# Patient Record
Sex: FEMALE | Race: WHITE | NOT HISPANIC OR LATINO | Employment: OTHER | URBAN - METROPOLITAN AREA
[De-identification: names, ages, dates, MRNs, and addresses within clinical notes are randomized per-mention and may not be internally consistent; named-entity substitution may affect disease eponyms.]

---

## 2017-01-03 ENCOUNTER — ANTI-COAG VISIT (OUTPATIENT)
Dept: CARDIOLOGY | Facility: CLINIC | Age: 82
End: 2017-01-03

## 2017-01-03 DIAGNOSIS — Z79.01 LONG TERM CURRENT USE OF ANTICOAGULANT THERAPY: ICD-10-CM

## 2017-01-03 LAB — INR PPP: 1.5

## 2017-01-03 RX ORDER — DONEPEZIL HYDROCHLORIDE 10 MG/1
TABLET, FILM COATED ORAL
Qty: 90 TABLET | Refills: 3 | Status: SHIPPED | OUTPATIENT
Start: 2017-01-03 | End: 2017-03-15 | Stop reason: SDUPTHER

## 2017-01-03 NOTE — PROGRESS NOTES
2.5mg thurs, fri, 5mg sat; missed sun, mon.  pt reports dental work on 1/10 three teeth to be  Pulled. [id number] score = 5, and recent cards note states in sinus. Will hold 2 days prior as INR already subtherapeutic. Pt to resume 1/10 at

## 2017-01-07 ENCOUNTER — NURSE TRIAGE (OUTPATIENT)
Dept: ADMINISTRATIVE | Facility: CLINIC | Age: 82
End: 2017-01-07

## 2017-01-07 NOTE — TELEPHONE ENCOUNTER
Reason for Disposition   [1] Other NON-URGENT information for PCP AND [2] does not require PCP response    Protocols used: ST PCP CALL - NO TRIAGE-A-    Appointment cancellation

## 2017-02-08 NOTE — PROGRESS NOTES
Referral authorization (Billing Code ), Tax ID [id number]; Processed with [employer] 2/8/17; Authorization code= [id number]

## 2017-03-07 ENCOUNTER — ANTI-COAG VISIT (OUTPATIENT)
Dept: CARDIOLOGY | Facility: CLINIC | Age: 82
End: 2017-03-07

## 2017-03-07 DIAGNOSIS — Z79.01 LONG TERM CURRENT USE OF ANTICOAGULANT THERAPY: ICD-10-CM

## 2017-03-07 LAB — INR PPP: 1.1

## 2017-03-14 ENCOUNTER — ANTI-COAG VISIT (OUTPATIENT)
Dept: CARDIOLOGY | Facility: CLINIC | Age: 82
End: 2017-03-14

## 2017-03-14 DIAGNOSIS — Z79.01 LONG TERM CURRENT USE OF ANTICOAGULANT THERAPY: ICD-10-CM

## 2017-03-14 LAB — INR PPP: 1.4

## 2017-03-15 ENCOUNTER — LAB VISIT (OUTPATIENT)
Dept: LAB | Facility: HOSPITAL | Age: 82
End: 2017-03-15
Attending: INTERNAL MEDICINE
Payer: MEDICARE

## 2017-03-15 ENCOUNTER — OFFICE VISIT (OUTPATIENT)
Dept: INTERNAL MEDICINE | Facility: CLINIC | Age: 82
End: 2017-03-15
Payer: MEDICARE

## 2017-03-15 VITALS
BODY MASS INDEX: 32.54 KG/M2 | HEIGHT: 62 IN | WEIGHT: 176.81 LBS | DIASTOLIC BLOOD PRESSURE: 70 MMHG | SYSTOLIC BLOOD PRESSURE: 130 MMHG | OXYGEN SATURATION: 94 % | HEART RATE: 46 BPM

## 2017-03-15 DIAGNOSIS — N18.30 TYPE 2 DIABETES MELLITUS WITH STAGE 3 CHRONIC KIDNEY DISEASE, WITHOUT LONG-TERM CURRENT USE OF INSULIN: ICD-10-CM

## 2017-03-15 DIAGNOSIS — N18.30 CKD (CHRONIC KIDNEY DISEASE) STAGE 3, GFR 30-59 ML/MIN: ICD-10-CM

## 2017-03-15 DIAGNOSIS — F03.91 DEMENTIA WITH BEHAVIORAL DISTURBANCE, UNSPECIFIED DEMENTIA TYPE: Primary | ICD-10-CM

## 2017-03-15 DIAGNOSIS — E11.22 TYPE 2 DIABETES MELLITUS WITH STAGE 3 CHRONIC KIDNEY DISEASE, WITHOUT LONG-TERM CURRENT USE OF INSULIN: ICD-10-CM

## 2017-03-15 DIAGNOSIS — I10 ESSENTIAL HYPERTENSION: ICD-10-CM

## 2017-03-15 DIAGNOSIS — J30.9 CHRONIC ALLERGIC RHINITIS: ICD-10-CM

## 2017-03-15 PROBLEM — E11.9 TYPE 2 DIABETES MELLITUS, WITHOUT LONG-TERM CURRENT USE OF INSULIN: Status: ACTIVE | Noted: 2017-03-15

## 2017-03-15 LAB
ALBUMIN SERPL BCP-MCNC: 3.3 G/DL
ALP SERPL-CCNC: 59 U/L
ALT SERPL W/O P-5'-P-CCNC: 11 U/L
ANION GAP SERPL CALC-SCNC: 8 MMOL/L
AST SERPL-CCNC: 10 U/L
BASOPHILS # BLD AUTO: 0.05 K/UL
BASOPHILS NFR BLD: 0.7 %
BILIRUB SERPL-MCNC: 0.6 MG/DL
BUN SERPL-MCNC: 17 MG/DL
CALCIUM SERPL-MCNC: 10 MG/DL
CHLORIDE SERPL-SCNC: 100 MMOL/L
CO2 SERPL-SCNC: 29 MMOL/L
CREAT SERPL-MCNC: 1.2 MG/DL
DIFFERENTIAL METHOD: NORMAL
EOSINOPHIL # BLD AUTO: 0.2 K/UL
EOSINOPHIL NFR BLD: 2.4 %
ERYTHROCYTE [DISTWIDTH] IN BLOOD BY AUTOMATED COUNT: 12.7 %
EST. GFR  (AFRICAN AMERICAN): 46.6 ML/MIN/1.73 M^2
EST. GFR  (NON AFRICAN AMERICAN): 40.4 ML/MIN/1.73 M^2
GLUCOSE SERPL-MCNC: 193 MG/DL
HCT VFR BLD AUTO: 46.4 %
HGB BLD-MCNC: 15.6 G/DL
LYMPHOCYTES # BLD AUTO: 1.4 K/UL
LYMPHOCYTES NFR BLD: 19.6 %
MCH RBC QN AUTO: 28.9 PG
MCHC RBC AUTO-ENTMCNC: 33.6 %
MCV RBC AUTO: 86 FL
MONOCYTES # BLD AUTO: 0.6 K/UL
MONOCYTES NFR BLD: 7.6 %
NEUTROPHILS # BLD AUTO: 5 K/UL
NEUTROPHILS NFR BLD: 69.3 %
PLATELET # BLD AUTO: 186 K/UL
PMV BLD AUTO: 9.4 FL
POTASSIUM SERPL-SCNC: 4.4 MMOL/L
PROT SERPL-MCNC: 6.7 G/DL
RBC # BLD AUTO: 5.4 M/UL
SODIUM SERPL-SCNC: 137 MMOL/L
WBC # BLD AUTO: 7.23 K/UL

## 2017-03-15 PROCEDURE — 1157F ADVNC CARE PLAN IN RCRD: CPT | Mod: S$GLB,,, | Performed by: INTERNAL MEDICINE

## 2017-03-15 PROCEDURE — 99999 PR PBB SHADOW E&M-EST. PATIENT-LVL III: CPT | Mod: PBBFAC,,, | Performed by: INTERNAL MEDICINE

## 2017-03-15 PROCEDURE — 1126F AMNT PAIN NOTED NONE PRSNT: CPT | Mod: S$GLB,,, | Performed by: INTERNAL MEDICINE

## 2017-03-15 PROCEDURE — 80053 COMPREHEN METABOLIC PANEL: CPT

## 2017-03-15 PROCEDURE — 99215 OFFICE O/P EST HI 40 MIN: CPT | Mod: S$GLB,,, | Performed by: INTERNAL MEDICINE

## 2017-03-15 PROCEDURE — 1159F MED LIST DOCD IN RCRD: CPT | Mod: S$GLB,,, | Performed by: INTERNAL MEDICINE

## 2017-03-15 PROCEDURE — 1160F RVW MEDS BY RX/DR IN RCRD: CPT | Mod: S$GLB,,, | Performed by: INTERNAL MEDICINE

## 2017-03-15 PROCEDURE — 83036 HEMOGLOBIN GLYCOSYLATED A1C: CPT

## 2017-03-15 PROCEDURE — 36415 COLL VENOUS BLD VENIPUNCTURE: CPT

## 2017-03-15 PROCEDURE — 85025 COMPLETE CBC W/AUTO DIFF WBC: CPT

## 2017-03-15 PROCEDURE — 99499 UNLISTED E&M SERVICE: CPT | Mod: S$GLB,,, | Performed by: INTERNAL MEDICINE

## 2017-03-15 RX ORDER — DONEPEZIL HYDROCHLORIDE 10 MG/1
10 TABLET, FILM COATED ORAL EVERY MORNING
Qty: 90 TABLET | Refills: 3 | Status: SHIPPED | OUTPATIENT
Start: 2017-03-15 | End: 2017-10-31 | Stop reason: SDUPTHER

## 2017-03-15 RX ORDER — NAPROXEN SODIUM 220 MG/1
81 TABLET, FILM COATED ORAL EVERY MORNING
Start: 2017-03-15 | End: 2017-05-03

## 2017-03-15 RX ORDER — FLUTICASONE PROPIONATE 50 MCG
2 SPRAY, SUSPENSION (ML) NASAL DAILY
Qty: 16 G | Refills: 12 | Status: SHIPPED | OUTPATIENT
Start: 2017-03-15 | End: 2017-04-14

## 2017-03-15 RX ORDER — AMLODIPINE BESYLATE 5 MG/1
5 TABLET ORAL EVERY MORNING
Qty: 90 TABLET | Refills: 3 | Status: SHIPPED | OUTPATIENT
Start: 2017-03-15 | End: 2017-10-31 | Stop reason: SDUPTHER

## 2017-03-15 RX ORDER — LISINOPRIL 10 MG/1
10 TABLET ORAL EVERY MORNING
Qty: 90 TABLET | Refills: 3 | Status: SHIPPED | OUTPATIENT
Start: 2017-03-15 | End: 2017-10-31 | Stop reason: SDUPTHER

## 2017-03-15 NOTE — MR AVS SNAPSHOT
[facility]  [address]  [facility] [address]  Phone: [phone]  Fax: [phone]                  Ifeoma Badillo   3/15/2017 1:00 PM   Office Visit    Description:  Female : 12/15/1928   Provider:  Perla Pina MD   Department:  [facility]           Reason for Visit     Dizziness     Dementia           Diagnoses this Visit        Comments    Dementia with behavioral disturbance, unspecified dementia type    -  Primary     Essential hypertension         Chronic allergic rhinitis         CKD (chronic kidney disease) stage 3, GFR 30-59 ml/min         Type 2 diabetes mellitus with stage 3 chronic kidney disease, without long-term current use of insulin                To Do List           Goals (5 Years of Data)     None      Follow-Up and Disposition     Return in about 6 months (around 9/15/2017).       These Medications        Disp Refills Start End    amlodipine (NORVASC) 5 MG tablet 90 tablet 3 3/15/2017 3/15/2018    Take 1 tablet (5 mg total) by mouth every morning. BP control - Oral    Pharmacy: [facility] - [address] Ph #: [phone]       lisinopril 10 MG tablet 90 tablet 3 3/15/2017     Take 1 tablet (10 mg total) by mouth every morning. - Oral    Pharmacy: [facility] - [address] Ph #: [phone]       donepezil (ARICEPT) 10 MG tablet 90 tablet 3 3/15/2017     Take 1 tablet (10 mg total) by mouth every morning. - Oral    Pharmacy: [facility] - [address] Ph #: [phone]       aspirin 81 MG Chew   3/15/2017     Take 1 tablet (81 mg total) by mouth every morning. - Oral    Pharmacy: [facility] - [address] Ph #: [phone]       fluticasone (FLONASE) 50 mcg/actuation nasal spray 16 g 12 3/15/2017 2017    2 sprays by Each Nare route once daily. - Each  [facility]    Pharmacy: [facility] [address] [facility] Ph #: [phone]         [facility]     [facility] On [facility] Nurse Care Line -  Assistance  Registered nurses in the [facility] provide clinical advisement, health education, appointment booking, and other advisory services.  Call for this free service at [phone].             Medications           Message regarding Medications     Verify the changes and/or additions to your medication regime listed below are the same as discussed with your clinician today.  If any of these changes or additions are incorrect, please notify your healthcare provider.        START taking these NEW medications        Refills    aspirin 81 MG Chew     Sig: Take 1 tablet (81 mg total) by mouth every morning.    Class: No Print    Route: Oral    fluticasone (FLONASE) 50 mcg/actuation nasal spray 12    Si sprays by Each Nare route once daily.    Class: Normal    Route: Each Nare      CHANGE how you are taking these medications     Start Taking Instead of    amlodipine (NORVASC) 5 MG tablet amlodipine (NORVASC) 5 MG tablet    Dosage:  Take 1 tablet (5 mg total) by mouth every morning. BP control Dosage:  Take 1 tablet (5 mg total) by mouth once daily. BP control    Reason for Change:  Reorder     lisinopril 10 MG tablet lisinopril 10 MG tablet    Dosage:  Take 1 tablet (10 mg total) by mouth every morning. Dosage:  TAKE 1 TABLET (10 MG TOTAL) BY MOUTH 2 (TWO) TIMES DAILY.    Reason for Change:  Reorder     donepezil (ARICEPT) 10 MG tablet donepezil (ARICEPT) 10 MG tablet    Dosage:  Take 1 tablet (10 mg total) by mouth every morning. Dosage:  TAKE 1 TABLET EVERY EVENING    Reason for Change:  Reorder       STOP taking these medications     aspirin 81 mg Tab Take by mouth. 1 Tablet Oral Every day    trospium (SANCTURA) 20 mg Tab tablet Take 1 tablet (20 mg total) by mouth 2 (two) times daily. To reduce frequent urination.     "warfarin (COUMADIN) 2.5 MG tablet Take 1 to 2 pills by mouth daily as directed per the [facility]           Verify that the below list of medications is an accurate representation of the medications you are currently taking.  If none reported, the list may be blank. If incorrect, please contact your healthcare provider. Carry this list with you in case of emergency.           Current Medications     amlodipine (NORVASC) 5 MG tablet Take 1 tablet (5 mg total) by mouth every morning. BP control    cholecalciferol, vitamin D3, (VITAMIN D3) 2,000 unit Cap Take 1 capsule by mouth once daily.    donepezil (ARICEPT) 10 MG tablet Take 1 tablet (10 mg total) by mouth every morning.    lisinopril 10 MG tablet Take 1 tablet (10 mg total) by mouth every morning.    meclizine (ANTIVERT) 12.5 mg tablet Take 12.5 mg by mouth 2 (two) times daily as needed.    metoprolol succinate (TOPROL-XL) 200 MG 24 hr tablet Take 1 tablet (200 mg total) by mouth once daily.    tramadol (ULTRAM) 50 mg tablet     aspirin 81 MG Chew Take 1 tablet (81 mg total) by mouth every morning.    fluticasone (FLONASE) 50 mcg/actuation nasal spray 2 sprays by Each Nare route once daily.           Clinical Reference Information           Your Vitals Were     BP Pulse Height Weight SpO2 BMI    130/70 46 5' 2" (1.575 m) 80.2 kg (176 lb 12.9 oz) 94% 32.34 kg/m2      Blood Pressure          Most Recent Value    BP  130/70      Allergies as of 3/15/2017     No Known Allergies      Immunizations Administered on Date of Encounter - 3/15/2017     None      Orders Placed During Today's Visit     Future Labs/Procedures Expected by Expires    CBC auto differential  3/15/2017 3/15/2018    Comprehensive metabolic panel  3/15/2017 5/14/2018    Hemoglobin A1c  3/15/2017 5/14/2018      [facility] Sign-Up     Activating your [facility] account is as easy as 1-2-3!     1) Visit [email or web address], select Sign Up Now, enter this activation code and your date of birth, then select " Next.  [email or web address]  Expires: 4/29/2017  2:12 PM      2) Create a username and password to use when you visit [facility] in the future and select a security question in case you lose your password and select Next.    3) Enter your e-mail address and click Sign Up!    Additional Information  If you have questions, please e-mail [email or web address] or call [phone] to talk to our [facility] staff. Remember, [facility] is NOT to be used for urgent needs. For medical emergencies, dial 911.         Instructions      Using Blood Thinners (Anticoagulants)  Blood thinners or anticoagulants are medicines that help prevent blood clots from forming. They include warfarin, heparin, dabigatran, rivaroxaban, apixaban,and edoxaban. Your health care provider will help you decide which medicine is best for you.    Taking an anticoagulant safely  When you are taking a blood thinner, you will need to take certain steps to stay safe. Too much blood thinner puts you at risk for bleeding. Too little puts you at risk for stroke. Follow these guidelines. Also follow any others that your health care provider gives you.  · You may be told you need regular lab testing while taking these medicines. Warfarin requires routine testing to blood-thinning level testing while the other medicines do not.  · Tell your doctor about all medicines you take. This includes over the counter medicines, supplements, or herbal remedies. Do not take any medicines (including ones you buy over the counter) that your doctor doesnt know about. Some medicines can interact with blood thinners and cause serious problems.  · Tell any health care provider that you see for care (such as doctors, dentists, chiropractors, home health nurses) that you take a blood thinner.  · Carry a medical ID card or wear a medical-alert bracelet that says you take an anticoagulant.  · Before taking aspirin, check with your doctor. Aspirin can significantly increase your risk of  bleeding.  · This medicine makes bleeding harder to stop. To protect yourself:  ¨ Avoid activities that may cause injury. If you fall or are injured, contact your health care provider right away. Blood thinners prevent clotting, so you could be bleeding inside without realizing it.  ¨ Use a soft-bristle toothbrush and waxed dental floss. Shave with an electric razor rather than a blade.  ¨ Dont go barefoot. Dont trim corns or calluses yourself.  Warfarin: Other important information  Several precautions are especially important when you are taking warfarin. Always keep these points in mind:  · Be sure to follow your health care provider's instructions for taking warfarin.  · Take this medicine at the same time each day. Take it with a full glass of water, with or without food. If you miss a dose, contact your doctor to find out how much to take. Avoid takinga double dose.  · Warfarin is an effective drug, but it can be dangerous if not taken properly. It makes your blood less likely to form clots. If you take too much, it can cause serious internal or external bleeding.  · You will need to have regular monitoring while you are taking warfarin. This includes blood tests to check your international normalized ratio (INR) and prothrombin time (PT). These tests show how quickly your blood clots. You will also have a complete blood count (CBC) periodically. This looks at your blood and platelet levels. Both of these need to be followed while you're on warfarin. Talk with your health care provider about whether you need to visit the clinic every week, or if services are available for monitoring in your home.  · Certain medicines can affect your INR and PT levels. Tell your health care provider if there are any changes in your medicines. This includes any over-the-counter medicines, supplements like vitamin K, or herbal remedies.  · Your diet can also affect your INR and PT levels. Because of this, it's important to eat  a consistent diet. It is especially important to eat a consistent amount of foods that are high in vitamin K. Be sure to talk with your health care provider before making any big changes in your diet.  · Remember that warfarin increases your risk of bleeding. Be careful not to injure yourself. If you have a significant injury, contact your health care provider right away. It's important to alert your doctor if you've fallen or hurt yourself, even if you don't break your skin. You could be bleeding inside your body without realizing it.     Warfarin: Watch your INR/PT blood levels  Two tests are used to find out how your blood is clotting. One is protime (PT), the other is the international normalized ratio (INR).  · Go for your blood tests (INR/PT) as often as directed. Your diet and the other medicines you take can affect your INR/PT levels.  · Your INR was between ___ and ___.  · Ask your doctor what your goal INR is. My goal INR is between ___ and ___.  · My next INR/PT blood draw is due on _____________ (date) at ___________ (time) by ___________ (name of doctor or clinic).  · The name of the doctor who is monitoring my anticoagulation therapy is _____________________ and the phone number is _________________.  · Follow up with your doctor or as advised by his or her staff. It usually takes a few hours for your doctor to get the results of your clotting tests. Call to get your lab results to find out if your doctor needs to make further changes to your warfarin dose.  · If your blood is drawn for these tests at a location other than your doctor's office, tell your doctor as soon as you get your lab results.   Warfarin: Watch what you eat  Vitamin K helps your blood clot. So you have to watch how much you eat of foods that contain vitamin K. These foods can affect the way warfarin works. They do not affect the other non-warfarin blood thinners.Here are some specific tips:  · Try to keep your diet about the same  each day. If you change your diet for any reason, such as for illness or to lose weight, be sure to tell your doctor.  · Each day, eat the same amount of foods that are high in vitamin K. These include asparagus, avocado, broccoli, cabbage, kale, spinach, and some other leafy green vegetables. Oils, such as soybean, canola, and olive oils, are also high in vitamin K.  · Limit fats to 2 to 4 tablespoons a day.  · Ask your health care provider if you should avoid alcohol while you are taking a blood thinner.  · Avoid teas that contain sweet clover, sweet kam, or tonka beans. These can affect how your medicine works.  · Talk with your health care provider and pharmacist about specific foods or special diets that can affect anticoagulant levels. These include grapefruit juice, cranberries and cranberry juice, fish oil supplements, garlic, osmin, licorice, turmeric, and herbal teas and supplements.  Talk with your health care provider if you have concerns about these or other food products and their effects on warfarin.     When to call your doctor if youre taking an anticoagulant  Call your doctor right away if you have any of these:  · Bleeding that doesnt stop in 10 minutes  · A heavier-than-normal menstrual period or bleeding between periods  · Coughing or throwing up blood  · Bloody diarrhea or bleeding hemorrhoids   · Dark-colored urine or black stools  · Red or black-and-blue marks on the skin that get larger  · Dizziness or fatigue  · Chest pain or trouble breathing  Allergic reactions:  · Rash  · Itching  · Swelling  · Trouble swallowing or breathing   Medical conditions and anticoagulants  Before starting a blood thinner, be sure your doctor knows if you have any of these conditions:  · Stomach ulcer now or in the past  · Vomited blood or had bloody stools (black or red color)  · Aneurysm, pericarditis, or pericardial effusion  · Blood disorder  · Recent surgery, stroke, mini-stroke, or spinal  puncture  · Kidney or liver disease, uncontrolled high blood pressure, diabetes, vasculitis, heart failure, lupus, or other collagen-vascular disease, or high cholesterol  · Pregnancy or breastfeeding  · Younger than 18 years old  · Recent or planned dental procedure  Drug interactions and anticoagulants  Many medicines interfere with the effect of blood thinners. Before starting these medicines, be sure your doctor knows about any prescription, over-the-counter, or herbal supplements you take. In particular, tell your provider about:  · Antibiotics  · Heart medicines  · Cimetidine  · Aspirin or other anti-inflammatory drugs such as ibuprofen, naproxen, ketoprofen, or other arthritis medicines  · Drugs for depression, cancer HIV (protease inhibitors), diabetes, seizures, gout, high cholesterol, or thyroid replacement  · Vitamins containing vitamin K or herbal products such as ginkgo, Co-Q10, garlic, or Valley's wort     Note: This information topic may not include all directions, precautions, medical conditions, drug/food interactions, and warnings for these medicines. Check with your doctor, nurse, or pharmacist for any questions you have.    Date Last Reviewed: 6/8/2015 © 2000-2016 [employer]. [address] [place] [address]. All rights reserved. This information is not intended as a substitute for professional medical care. Always follow your healthcare professional's instructions.             Language Assistance Services     ATTENTION: Language assistance services are available, free of charge. Please call [phone].      ATENCIÓN: Si habla español, tiene a easton disposición servicios gratuitos de asistencia lingüística. Llame al [phone].     [facility] Ý: N?u b?n nói Ti?ng Vi?t, có các d?ch v? h? tr? ngôn ng? mi?n phí dành cho b?n. G?i s? [phone].         [facility] complies with applicable Federal civil rights laws and does not discriminate on the basis of  race, color, national origin, age, disability, or sex.

## 2017-03-15 NOTE — PATIENT INSTRUCTIONS
Using Blood Thinners (Anticoagulants)  Blood thinners or anticoagulants are medicines that help prevent blood clots from forming. They include warfarin, heparin, dabigatran, rivaroxaban, apixaban,and edoxaban. Your health care provider will help you decide which medicine is best for you.    Taking an anticoagulant safely  When you are taking a blood thinner, you will need to take certain steps to stay safe. Too much blood thinner puts you at risk for bleeding. Too little puts you at risk for stroke. Follow these guidelines. Also follow any others that your health care provider gives you.  · You may be told you need regular lab testing while taking these medicines. Warfarin requires routine testing to blood-thinning level testing while the other medicines do not.  · Tell your doctor about all medicines you take. This includes over the counter medicines, supplements, or herbal remedies. Do not take any medicines (including ones you buy over the counter) that your doctor doesnt know about. Some medicines can interact with blood thinners and cause serious problems.  · Tell any health care provider that you see for care (such as doctors, dentists, chiropractors, home health nurses) that you take a blood thinner.  · Carry a medical ID card or wear a medical-alert bracelet that says you take an anticoagulant.  · Before taking aspirin, check with your doctor. Aspirin can significantly increase your risk of bleeding.  · This medicine makes bleeding harder to stop. To protect yourself:  ¨ Avoid activities that may cause injury. If you fall or are injured, contact your health care provider right away. Blood thinners prevent clotting, so you could be bleeding inside without realizing it.  ¨ Use a soft-bristle toothbrush and waxed dental floss. Shave with an electric razor rather than a blade.  ¨ Dont go barefoot. Dont trim corns or calluses yourself.  Warfarin: Other important information  Several precautions are  especially important when you are taking warfarin. Always keep these points in mind:  · Be sure to follow your health care provider's instructions for taking warfarin.  · Take this medicine at the same time each day. Take it with a full glass of water, with or without food. If you miss a dose, contact your doctor to find out how much to take. Avoid takinga double dose.  · Warfarin is an effective drug, but it can be dangerous if not taken properly. It makes your blood less likely to form clots. If you take too much, it can cause serious internal or external bleeding.  · You will need to have regular monitoring while you are taking warfarin. This includes blood tests to check your international normalized ratio (INR) and prothrombin time (PT). These tests show how quickly your blood clots. You will also have a complete blood count (CBC) periodically. This looks at your blood and platelet levels. Both of these need to be followed while you're on warfarin. Talk with your health care provider about whether you need to visit the clinic every week, or if services are available for monitoring in your home.  · Certain medicines can affect your INR and PT levels. Tell your health care provider if there are any changes in your medicines. This includes any over-the-counter medicines, supplements like vitamin K, or herbal remedies.  · Your diet can also affect your INR and PT levels. Because of this, it's important to eat a consistent diet. It is especially important to eat a consistent amount of foods that are high in vitamin K. Be sure to talk with your health care provider before making any big changes in your diet.  · Remember that warfarin increases your risk of bleeding. Be careful not to injure yourself. If you have a significant injury, contact your health care provider right away. It's important to alert your doctor if you've fallen or hurt yourself, even if you don't break your skin. You could be bleeding inside your  body without realizing it.     Warfarin: Watch your INR/PT blood levels  Two tests are used to find out how your blood is clotting. One is protime (PT), the other is the international normalized ratio (INR).  · Go for your blood tests (INR/PT) as often as directed. Your diet and the other medicines you take can affect your INR/PT levels.  · Your INR was between ___ and ___.  · Ask your doctor what your goal INR is. My goal INR is between ___ and ___.  · My next INR/PT blood draw is due on _____________ (date) at ___________ (time) by ___________ (name of doctor or clinic).  · The name of the doctor who is monitoring my anticoagulation therapy is _____________________ and the phone number is _________________.  · Follow up with your doctor or as advised by his or her staff. It usually takes a few hours for your doctor to get the results of your clotting tests. Call to get your lab results to find out if your doctor needs to make further changes to your warfarin dose.  · If your blood is drawn for these tests at a location other than your doctor's office, tell your doctor as soon as you get your lab results.   Warfarin: Watch what you eat  Vitamin K helps your blood clot. So you have to watch how much you eat of foods that contain vitamin K. These foods can affect the way warfarin works. They do not affect the other non-warfarin blood thinners.Here are some specific tips:  · Try to keep your diet about the same each day. If you change your diet for any reason, such as for illness or to lose weight, be sure to tell your doctor.  · Each day, eat the same amount of foods that are high in vitamin K. These include asparagus, avocado, broccoli, cabbage, kale, spinach, and some other leafy green vegetables. Oils, such as soybean, canola, and olive oils, are also high in vitamin K.  · Limit fats to 2 to 4 tablespoons a day.  · Ask your health care provider if you should avoid alcohol while you are taking a blood  thinner.  · Avoid teas that contain sweet clover, sweet kam, or tonka beans. These can affect how your medicine works.  · Talk with your health care provider and pharmacist about specific foods or special diets that can affect anticoagulant levels. These include grapefruit juice, cranberries and cranberry juice, fish oil supplements, garlic, osmin, licorice, turmeric, and herbal teas and supplements.  Talk with your health care provider if you have concerns about these or other food products and their effects on warfarin.     When to call your doctor if youre taking an anticoagulant  Call your doctor right away if you have any of these:  · Bleeding that doesnt stop in 10 minutes  · A heavier-than-normal menstrual period or bleeding between periods  · Coughing or throwing up blood  · Bloody diarrhea or bleeding hemorrhoids   · Dark-colored urine or black stools  · Red or black-and-blue marks on the skin that get larger  · Dizziness or fatigue  · Chest pain or trouble breathing  Allergic reactions:  · Rash  · Itching  · Swelling  · Trouble swallowing or breathing   Medical conditions and anticoagulants  Before starting a blood thinner, be sure your doctor knows if you have any of these conditions:  · Stomach ulcer now or in the past  · Vomited blood or had bloody stools (black or red color)  · Aneurysm, pericarditis, or pericardial effusion  · Blood disorder  · Recent surgery, stroke, mini-stroke, or spinal puncture  · Kidney or liver disease, uncontrolled high blood pressure, diabetes, vasculitis, heart failure, lupus, or other collagen-vascular disease, or high cholesterol  · Pregnancy or breastfeeding  · Younger than 18 years old  · Recent or planned dental procedure  Drug interactions and anticoagulants  Many medicines interfere with the effect of blood thinners. Before starting these medicines, be sure your doctor knows about any prescription, over-the-counter, or herbal supplements you take. In  particular, tell your provider about:  · Antibiotics  · Heart medicines  · Cimetidine  · Aspirin or other anti-inflammatory drugs such as ibuprofen, naproxen, ketoprofen, or other arthritis medicines  · Drugs for depression, cancer HIV (protease inhibitors), diabetes, seizures, gout, high cholesterol, or thyroid replacement  · Vitamins containing vitamin K or herbal products such as ginkgo, Co-Q10, garlic, or Gisel's wort     Note: This information topic may not include all directions, precautions, medical conditions, drug/food interactions, and warnings for these medicines. Check with your doctor, nurse, or pharmacist for any questions you have.    Date Last Reviewed: 6/8/2015  © 9389-9012 [employer]. [address] [place] [address], [place] [address]. All rights reserved. This information is not intended as a substitute for professional medical care. Always follow your healthcare professional's instructions.

## 2017-03-16 LAB
ESTIMATED AVG GLUCOSE: 163 MG/DL
HBA1C MFR BLD HPLC: 7.3 %

## 2017-03-16 NOTE — PROGRESS NOTES
Subjective:       Patient ID: Ifeoma Badillo is a 88 y.o. female.    Chief Complaint: Dizziness and Dementia   her daughter Bernarda who lives in [facility] and works full time comes to the office today with her   and with her son Al who is her primary caregiver.    The patient has PAF on coumadin, symptomatic atrial ectopy, diastolic dysfunction, HTN, DM2, HLD, NPH s/p  shunt, moderate Alzheimer's dementia, and vertigo.    Al is showing signs of significant caregiver burnout.   Al does not have anyone to to take his place.  He does not have any time for himself.  He does leave his mother and she is safe unattended for short periods of time such as while he goes to the grocery store or the hardware store.    Al's main concern is that she is fighting him daily, not wanting to take her medication and will not take her medications without significant fussing.  It is wearing him down.    She is particularly afraid of Coumadin.  This started a few months ago when she had to have her bottom teeth pulled because of a gum infection.  She absolutely refused to take the Coumadin for over a month.  She has been back taking Coumadin pretty much daily since March 1.  Al  is in charge of checking her pro time at home.  It most recently was 1.4.  This gives him significant distress.  Her goal INR is 2-3.    The patient and Al both want to minimize medications.  The patient says she can't stand taking medications.  She no longer takes lovastatin because she is so poorly ambulatory it would be impossible to assess muscle toxicity.  Initially when she started taking Sanctura 20 mg twice a day seemed to help reduce her urinary frequency, but it is no longer working.  This medication will be discontinued.  HPI  Review of Systems   Constitutional: Negative for activity change, appetite change, chills, fatigue, fever and unexpected weight change.   HENT: Negative for hearing loss.    Eyes: Negative for visual disturbance.    Respiratory: Negative for cough, chest tightness, shortness of breath and wheezing.    Cardiovascular: Negative for chest pain, palpitations and leg swelling.   Gastrointestinal: Negative for abdominal pain, constipation, nausea and vomiting.   Genitourinary: Negative for dysuria, frequency and urgency.   Musculoskeletal: Negative for arthralgias, back pain, gait problem, joint swelling and myalgias.   Skin: Negative for rash.   Neurological: Negative for light-headedness and headaches.   Psychiatric/Behavioral: Negative for dysphoric mood and sleep disturbance. The patient is not nervous/anxious.        Objective:      Physical Exam   Constitutional: She is oriented to person, place, and time. She appears well-developed and well-nourished. No distress.   HENT:   Head: Atraumatic.   Eyes: Conjunctivae are normal. No scleral icterus.   Neck: Neck supple.   Cardiovascular: Normal rate.    Rhythm appears to be atrial fib   Pulmonary/Chest: Effort normal and breath sounds normal. No respiratory distress. She has no wheezes. She has no rales. She exhibits no tenderness.   Abdominal: Soft. There is no tenderness.   Musculoskeletal: She exhibits no edema.   Lymphadenopathy:     She has no cervical adenopathy.   Neurological: She is alert and oriented to person, place, and time.   Skin: Skin is warm and dry.   Psychiatric: She has a normal mood and affect. Her behavior is normal.   Nursing note and vitals reviewed.      Assessment:       1. Dementia with behavioral disturbance, unspecified dementia type    2. Essential hypertension    3. Chronic allergic rhinitis    4. CKD (chronic kidney disease) stage 3, GFR 30-59 ml/min    5. Type 2 diabetes mellitus with stage 3 chronic kidney disease, without long-term current use of insulin        Plan:   Ifeoma was seen today for dizziness and dementia.    Diagnoses and all orders for this visit:    Dementia with behavioral disturbance, unspecified dementia type, slowly  progressive.  Diminished ambulatory ability.  Uses a walker.  Requires assistance with activities of daily living.  Her caregiver is finding it taxing to fight with her over taking Coumadin every day and monitoring her INR.  Multiple issues discussed.  This visit took over 1 hour.  I strongly recommend that Al try to develop a routine perhaps taking his mother to a [facility] either in [place] or in [place].  Informed about a new Alzheimer's support group available for caregivers at [facility].      I think there comes a time when the risk of a medication versus the benefits need to be seriously reevaluated.  At this time, it is my opinion that she should discontinue Coumadin.  Her two children Bernarda and Al are not sure if they want their mother to stop taking Coumadin on my recommendation or if they would rather meet with Dr. Polanco.  I will send this note to Dr. Polanco.    Essential hypertension  -     amlodipine (NORVASC) 5 MG tablet; Take 1 tablet (5 mg total) by mouth every morning. BP control    Chronic allergic rhinitis    CKD (chronic kidney disease) stage 3, GFR 30-59 ml/min  -     Comprehensive metabolic panel; Future  -     CBC auto differential; Future    Type 2 diabetes mellitus with stage 3 chronic kidney disease, without long-term current use of insulin  -     Hemoglobin A1c; Future    Other orders.  I simplify her medications as much as possible.  -     lisinopril 10 MG tablet; Take 1 tablet (10 mg total) by mouth every morning.  -     donepezil (ARICEPT) 10 MG tablet; Take 1 tablet (10 mg total) by mouth every morning.  -     aspirin 81 MG Chew; Take 1 tablet (81 mg total) by mouth every morning.  -     fluticasone (FLONASE) 50 mcg/actuation nasal spray; 2 sprays by Each Nare route once daily.  Medication List with Changes/Refills   New Medications    ASPIRIN 81 MG CHEW    Take 1 tablet (81 mg total) by mouth every morning.    FLUTICASONE  (FLONASE) 50 MCG/ACTUATION NASAL SPRAY    2 sprays by Each Nare route once daily.   Current Medications    CHOLECALCIFEROL, VITAMIN D3, (VITAMIN D3) 2,000 UNIT CAP    Take 1 capsule by mouth once daily.    MECLIZINE (ANTIVERT) 12.5 MG TABLET    Take 12.5 mg by mouth 2 (two) times daily as needed.    METOPROLOL SUCCINATE (TOPROL-XL) 200 MG 24 HR TABLET    Take 1 tablet (200 mg total) by mouth once daily.    TRAMADOL (ULTRAM) 50 MG TABLET       Changed and/or Refilled Medications    Modified Medication Previous Medication    AMLODIPINE (NORVASC) 5 MG TABLET amlodipine (NORVASC) 5 MG tablet       Take 1 tablet (5 mg total) by mouth every morning. BP control    Take 1 tablet (5 mg total) by mouth once daily. BP control    DONEPEZIL (ARICEPT) 10 MG TABLET donepezil (ARICEPT) 10 MG tablet       Take 1 tablet (10 mg total) by mouth every morning.    TAKE 1 TABLET EVERY EVENING    LISINOPRIL 10 MG TABLET lisinopril 10 MG tablet       Take 1 tablet (10 mg total) by mouth every morning.    TAKE 1 TABLET (10 MG TOTAL) BY MOUTH 2 (TWO) TIMES DAILY.   Discontinued Medications    ASPIRIN 81 MG TAB    Take by mouth. 1 Tablet Oral Every day    TROSPIUM (SANCTURA) 20 MG TAB TABLET    Take 1 tablet (20 mg total) by mouth 2 (two) times daily. To reduce frequent urination.    WARFARIN (COUMADIN) 2.5 MG TABLET    Take 1 to 2 pills by mouth daily as directed per the [facility]

## 2017-03-29 RX ORDER — TRAMADOL HYDROCHLORIDE 50 MG/1
TABLET ORAL
Qty: 270 TABLET | Refills: 1 | Status: SHIPPED | OUTPATIENT
Start: 2017-03-29 | End: 2017-04-06 | Stop reason: SDUPTHER

## 2017-03-30 NOTE — TELEPHONE ENCOUNTER
I printed a prescription for a tramadol refill.  I'm not sure where the patient has been receiving this medication.  It was set up to go to [facility].  I'm not sure if [facility] delivers this. Generally, the [facility] pharmacy is the most economical for the patient.

## 2017-04-04 ENCOUNTER — TELEPHONE (OUTPATIENT)
Dept: INTERNAL MEDICINE | Facility: CLINIC | Age: 82
End: 2017-04-04

## 2017-04-04 ENCOUNTER — ANTI-COAG VISIT (OUTPATIENT)
Dept: CARDIOLOGY | Facility: CLINIC | Age: 82
End: 2017-04-04

## 2017-04-04 DIAGNOSIS — Z79.01 LONG TERM CURRENT USE OF ANTICOAGULANT THERAPY: ICD-10-CM

## 2017-04-04 NOTE — PROGRESS NOTES
"Per Dr. Pina "At this time, it is my opinion that she should discontinue Coumadin." I am discharging her from the clinic.  "

## 2017-04-06 ENCOUNTER — TELEPHONE (OUTPATIENT)
Dept: INTERNAL MEDICINE | Facility: CLINIC | Age: 82
End: 2017-04-06

## 2017-04-06 RX ORDER — TRAMADOL HYDROCHLORIDE 50 MG/1
TABLET ORAL
Qty: 270 TABLET | Refills: 1 | Status: SHIPPED | OUTPATIENT
Start: 2017-04-06 | End: 2017-10-31 | Stop reason: ALTCHOICE

## 2017-04-20 ENCOUNTER — TELEPHONE (OUTPATIENT)
Dept: CARDIOLOGY | Facility: CLINIC | Age: 82
End: 2017-04-20

## 2017-04-20 DIAGNOSIS — I49.1 ATRIAL ECTOPY: ICD-10-CM

## 2017-04-20 DIAGNOSIS — I48.20 CHRONIC ATRIAL FIBRILLATION: Primary | ICD-10-CM

## 2017-04-20 RX ORDER — METOPROLOL SUCCINATE 100 MG/1
100 TABLET, EXTENDED RELEASE ORAL DAILY
Qty: 30 TABLET | Refills: 1 | Status: SHIPPED | OUTPATIENT
Start: 2017-04-20 | End: 2017-05-03 | Stop reason: SDUPTHER

## 2017-04-20 NOTE — TELEPHONE ENCOUNTER
----- Message from Jerrod Andrews sent at 4/20/2017  8:31 AM CDT -----  Contact: pt daughter/Bernarda  Please call pt daughter at [phone]. Having dizzy spells and nausea off and on for a week. Pt daughter refused an appt today.Last seen Dr Polanco 10/12/16     Thank you

## 2017-04-20 NOTE — TELEPHONE ENCOUNTER
I called patient's daughter and spoke with her regarding her mother's care. The patient, her daughter, and here PCP had a lengthy discussion regarding risk/benefit of continuing coumadin and decided to discontinue it given patient's dementia and and potential risk of bleeding, which I am fine with considering the last documented AF in Epic was in 1/2015. I made her daughter aware that stopping the coumadin puts her at an increase risk of stroke, which she understands. In addition, her mother has been experiencing malaise and dizziness. I noted that her HR was 46 on 3/15/17 at PCP clinic. Review of her clinic vital signs over the prior 2-3 years shows that HR range 60s-80s. Will decrease Toprol XL from 200 mg daily to 100 mg daily and instructed her to check HR using home BP cuff. Will also arrange for 12-lead EKG in the next week as well as follow-up appointment in the next few weeks.

## 2017-04-20 NOTE — TELEPHONE ENCOUNTER
[Spoke with the pt's daughter at 9:04 am.] Dr. Polanco, the pt's daughter says the pt's PCP stopped Coumadin - says that the pt refused to take it and was combative. Also, says that the pt feels bad on a daily basis, and when she first saw you two years ago she was dizzy - says that the pt is c/o being dizzy again, and also c/o nausea.  Asked if the dizziness was the same as two years ago - didn't get a response from the daughter.  Says the the pt's BP and HR are fine - BP at visit with PCP on 3-15-17 was 130/70 and 46 - says that they don't monitor at home.  Reviewed pt's meds regarding nausea - advised that the pt take an enteric ASA, [taking chewable] and take with a meal.  Daughter says that pt had blood work ordered by PCP and has not received results. Phone  says that the daughter refused an appt today. Please advise. Thanks, Lindsey

## 2017-04-27 ENCOUNTER — HOSPITAL ENCOUNTER (OUTPATIENT)
Dept: CARDIOLOGY | Facility: CLINIC | Age: 82
Discharge: HOME | End: 2017-04-27
Payer: MEDICARE

## 2017-04-27 DIAGNOSIS — I48.20 CHRONIC ATRIAL FIBRILLATION: ICD-10-CM

## 2017-04-27 PROCEDURE — 93000 ELECTROCARDIOGRAM COMPLETE: CPT | Mod: S$GLB,,, | Performed by: INTERNAL MEDICINE

## 2017-05-03 ENCOUNTER — OFFICE VISIT (OUTPATIENT)
Dept: CARDIOLOGY | Facility: CLINIC | Age: 82
End: 2017-05-03
Payer: MEDICARE

## 2017-05-03 VITALS
BODY MASS INDEX: 31.72 KG/M2 | HEART RATE: 92 BPM | WEIGHT: 172.38 LBS | DIASTOLIC BLOOD PRESSURE: 68 MMHG | SYSTOLIC BLOOD PRESSURE: 153 MMHG | HEIGHT: 62 IN

## 2017-05-03 DIAGNOSIS — F03.91 DEMENTIA WITH BEHAVIORAL DISTURBANCE, UNSPECIFIED DEMENTIA TYPE: ICD-10-CM

## 2017-05-03 DIAGNOSIS — E11.22 TYPE 2 DIABETES MELLITUS WITH STAGE 3 CHRONIC KIDNEY DISEASE, WITHOUT LONG-TERM CURRENT USE OF INSULIN: ICD-10-CM

## 2017-05-03 DIAGNOSIS — E78.1 PURE HYPERGLYCERIDEMIA: ICD-10-CM

## 2017-05-03 DIAGNOSIS — I48.20 CHRONIC ATRIAL FIBRILLATION: ICD-10-CM

## 2017-05-03 DIAGNOSIS — I51.9 LEFT VENTRICULAR DIASTOLIC DYSFUNCTION: ICD-10-CM

## 2017-05-03 DIAGNOSIS — N18.30 CONTROLLED TYPE 2 DIABETES MELLITUS WITH STAGE 3 CHRONIC KIDNEY DISEASE, WITHOUT LONG-TERM CURRENT USE OF INSULIN: ICD-10-CM

## 2017-05-03 DIAGNOSIS — R42 VERTIGO: ICD-10-CM

## 2017-05-03 DIAGNOSIS — E11.22 CONTROLLED TYPE 2 DIABETES MELLITUS WITH STAGE 3 CHRONIC KIDNEY DISEASE, WITHOUT LONG-TERM CURRENT USE OF INSULIN: ICD-10-CM

## 2017-05-03 DIAGNOSIS — I15.2 HYPERTENSION ASSOCIATED WITH DIABETES: Primary | ICD-10-CM

## 2017-05-03 DIAGNOSIS — N18.30 TYPE 2 DIABETES MELLITUS WITH STAGE 3 CHRONIC KIDNEY DISEASE, WITHOUT LONG-TERM CURRENT USE OF INSULIN: ICD-10-CM

## 2017-05-03 DIAGNOSIS — E11.59 HYPERTENSION ASSOCIATED WITH DIABETES: Primary | ICD-10-CM

## 2017-05-03 PROCEDURE — 1160F RVW MEDS BY RX/DR IN RCRD: CPT | Mod: GC,S$GLB,, | Performed by: INTERNAL MEDICINE

## 2017-05-03 PROCEDURE — 99999 PR PBB SHADOW E&M-EST. PATIENT-LVL III: CPT | Mod: PBBFAC,GC,, | Performed by: INTERNAL MEDICINE

## 2017-05-03 PROCEDURE — 1126F AMNT PAIN NOTED NONE PRSNT: CPT | Mod: GC,S$GLB,, | Performed by: INTERNAL MEDICINE

## 2017-05-03 PROCEDURE — 1159F MED LIST DOCD IN RCRD: CPT | Mod: GC,S$GLB,, | Performed by: INTERNAL MEDICINE

## 2017-05-03 PROCEDURE — 99214 OFFICE O/P EST MOD 30 MIN: CPT | Mod: GC,S$GLB,, | Performed by: INTERNAL MEDICINE

## 2017-05-03 RX ORDER — CETIRIZINE HYDROCHLORIDE 10 MG/1
10 TABLET ORAL DAILY
COMMUNITY
End: 2018-06-28 | Stop reason: ALTCHOICE

## 2017-05-03 RX ORDER — METOPROLOL SUCCINATE 100 MG/1
100 TABLET, EXTENDED RELEASE ORAL DAILY
Qty: 90 TABLET | Refills: 3 | Status: SHIPPED | OUTPATIENT
Start: 2017-05-03 | End: 2017-10-31 | Stop reason: SDUPTHER

## 2017-05-03 RX ORDER — MECLIZINE HCL 12.5 MG 12.5 MG/1
12.5 TABLET ORAL 2 TIMES DAILY PRN
Qty: 30 TABLET | Refills: 3 | Status: SHIPPED | OUTPATIENT
Start: 2017-05-03 | End: 2018-06-28 | Stop reason: ALTCHOICE

## 2017-05-03 NOTE — PROGRESS NOTES
"Cardiology Clinic Note    5/3/2017    SUBJECTIVE  Ifeoma Badillo is a 88 y.o. female with a history significant for PAF, symptomatic ectopy, diastolic dysfunction, HTN, DM2, HLD, NPH s/p  shunt, moderate Alzheimer's dementia, and vertigo who presents for routine f/u. She is accompanied by her daughter and son. Overall she has developed progressive memory loss and has become less active. She recently discontinued coumadin at her last PCP's appointment after a discussion with her PCP and family. She's at the point where she doesn't understand why she has to take coumadin and have her blood checked frequently. In addition, she has become weaker and is a fall risk. In addition, she had been experiencing malaise and lightheadedness for which her daughter called me. I noted that her HR was 46 on 3/15/17 at PCP clinic. Review of her clinic vital signs over the prior 2-3 years shows that HR range 60s-80s. I decreased Toprol XL from 200 mg daily to 100 mg daily and had her check her VS daily. Her symptoms resolved along with an improvement in her HR to 60s-70s by the log her family brought today. She still has occasional dizzy spells that resolve with Meclizine. She reports occasional palpitations described as "skipped beats" but denies "racing" palpitations, CP, or syncope. She has mild stable HAZEL, but denies orthopnea, PND, or weight gain. Pt also states she need clearance for dental procedure (root canal vs. tooth extraction).    ROS:  Review of Systems   Constitution: Negative for chills, fever and weakness.   HENT: Negative for congestion and headaches.    Cardiovascular: Positive for irregular heartbeat. Negative for chest pain, claudication, dyspnea on exertion, leg swelling, near-syncope, orthopnea, palpitations, paroxysmal nocturnal dyspnea and syncope.   Respiratory: Negative for cough and shortness of breath.    Hematologic/Lymphatic: Negative for bleeding problem. Bruises/bleeds easily (bruising). "   Musculoskeletal: Negative for joint pain, joint swelling, muscle cramps, muscle weakness and myalgias.   Gastrointestinal: Negative for abdominal pain and heartburn.   Neurological: Negative for focal weakness and light-headedness.   Psychiatric/Behavioral: Positive for memory loss. Negative for altered mental status.       Past Medical History:   Diagnosis Date    Anemia     history    Anticoagulant long-term use     Arthritis     osteoarthritis-right shoulder    Atrial fibrillation     Back problem     due to spinal stenosis/degenerative disc disease    COPD (chronic obstructive pulmonary disease)     Degenerative disc disease     DEMENTIA     Diabetes mellitus type II     A1c-7.2 (2/22/13)    Diabetes mellitus with renal manifestations, controlled 7/6/2015    Hearing impairment     mild    Hx: UTI (urinary tract infection)     Hydrocephalus     Hyperlipidemia 2/25/2013    Hypertension     Imbalance     uses walker daily    Neurogenic bladder     Nocturia     NPH (normal pressure hydrocephalus) shunt    Obesity     Sinus congestion     nasal drip    Sleep apnea     Type II or unspecified type diabetes mellitus with neurological manifestations, not stated as uncontrolled     Type II or unspecified type diabetes mellitus with peripheral circulatory disorders, not stated as uncontrolled     Type II or unspecified type diabetes mellitus with renal manifestations, not stated as uncontrolled     Urinary incontinence     Vertigo     Visual impairment in both eyes        Past Surgical History:   Procedure Laterality Date    ADENOIDECTOMY      BRAIN SURGERY       shunt    CATARACT EXTRACTION W/  INTRAOCULAR LENS IMPLANT Bilateral     cystoscope  12/1/15    cystoscopy with botox injection      EYE SURGERY      phaco with iol-bilateral    SHOULDER SURGERY      right collar bone fracture-s/p fx sx with hardware placed    SINUS SURGERY      tonsillectomy      TONSILLECTOMY        shunt placed      for hydrocephalus       Family History   Problem Relation Age of Onset    Hypertension Mother     No Known Problems Brother     Arthritis Daughter     Arthritis Son     Hypertension Son     No Known Problems Brother     No Known Problems Brother     No Known Problems Brother     Alzheimer's disease Brother     No Known Problems Son     No Known Problems Maternal Aunt     No Known Problems Maternal Uncle     No Known Problems Paternal Aunt     No Known Problems Paternal Uncle     No Known Problems Maternal Grandmother     No Known Problems Maternal Grandfather     No Known Problems Paternal Grandmother     No Known Problems Paternal Grandfather     Anesthesia problems Neg Hx     Malignant hypertension Neg Hx     Hypotension Neg Hx     Malignant hyperthermia Neg Hx     Pseudochol deficiency Neg Hx     Amblyopia Neg Hx     Blindness Neg Hx     Cancer Neg Hx     Cataracts Neg Hx     Diabetes Neg Hx     Glaucoma Neg Hx     Macular degeneration Neg Hx     Retinal detachment Neg Hx     Strabismus Neg Hx     Stroke Neg Hx     Thyroid disease Neg Hx        Social History     Social History    Marital status:      Spouse name: N/A    Number of children: N/A    Years of education: N/A     Social History Main Topics    Smoking status: Former Smoker     Packs/day: 1.00     Years: 40.00     Quit date: 1/1/1993    Smokeless tobacco: None    Alcohol use No    Drug use: No    Sexual activity: No     Other Topics Concern    None     Social History Narrative       Review of patient's allergies indicates:  No Known Allergies    Current Outpatient Prescriptions on File Prior to Visit   Medication Sig Dispense Refill    amlodipine (NORVASC) 5 MG tablet Take 1 tablet (5 mg total) by mouth every morning. BP control 90 tablet 3    cholecalciferol, vitamin D3, (VITAMIN D3) 2,000 unit Cap Take 1 capsule by mouth once daily.      donepezil (ARICEPT) 10 MG tablet Take 1  "tablet (10 mg total) by mouth every morning. 90 tablet 3    lisinopril 10 MG tablet Take 1 tablet (10 mg total) by mouth every morning. 90 tablet 3    tramadol (ULTRAM) 50 mg tablet TAKE 1 TABLET THREE TIMES DAILY AS NEEDED FOR PAIN 270 tablet 1    [DISCONTINUED] meclizine (ANTIVERT) 12.5 mg tablet Take 12.5 mg by mouth 2 (two) times daily as needed.      [DISCONTINUED] metoprolol succinate (TOPROL-XL) 100 MG 24 hr tablet Take 1 tablet (100 mg total) by mouth once daily. 30 tablet 1    [DISCONTINUED] aspirin 81 MG Chew Take 1 tablet (81 mg total) by mouth every morning.       No current facility-administered medications on file prior to visit.        OBJECTIVE    Vitals: BP (!) 153/68 (BP Location: Left arm, Patient Position: Sitting, BP Method: Automatic)  Pulse 92  Ht 5' 2" (1.575 m)  Wt 78.2 kg (172 lb 6.4 oz)  BMI 31.53 kg/m2  Physical Exam   Constitutional: She appears healthy. No distress.   HENT:   Mouth/Throat: Oropharynx is clear.   Eyes: Conjunctivae are normal.   Neck: Normal range of motion. Neck supple. No JVD present.   Cardiovascular: Normal rate, regular rhythm, S1 normal, S2 normal, normal heart sounds and normal pulses.  Frequent extrasystoles are present. PMI is not displaced.  Exam reveals no gallop.    No murmur heard.  Pulses:       Carotid pulses are 2+ on the right side, and 2+ on the left side.       Radial pulses are 2+ on the right side, and 2+ on the left side.   Pulmonary/Chest: Effort normal and breath sounds normal. She has no wheezes. She has no rales. She exhibits no tenderness.   Abdominal: Soft. Bowel sounds are normal. She exhibits no distension and no mass. There is no splenomegaly or hepatomegaly. There is no tenderness.   Musculoskeletal: She exhibits edema (trace pedal edema, R>L). She exhibits no deformity.   Neurological: She is alert and oriented to person, place, and time. She has normal motor skills. Gait normal.   Skin: Skin is warm and dry. No rash noted.   BLE " varicose veins       Lab Results   Component Value Date    WBC 7.23 03/15/2017    HGB 15.6 03/15/2017    HCT 46.4 03/15/2017     03/15/2017    CHOL 222 (H) 06/21/2016    TRIG 172 (H) 06/21/2016    HDL 43 06/21/2016    ALT 11 03/15/2017    AST 10 03/15/2017     03/15/2017    K 4.4 03/15/2017     03/15/2017    CREATININE 1.2 03/15/2017    BUN 17 03/15/2017    CO2 29 03/15/2017    TSH 1.188 06/21/2016    INR 1.4 03/14/2017    HGBA1C 7.3 (H) 03/15/2017      Other Results  Echo 4/5/16:  1 - Eccentric hypertrophy.     2 - Normal left ventricular systolic function (EF 55-60%).     3 - Normal right ventricular systolic function .     4 - Left ventricular diastolic dysfunction.     5 - Moderate left atrial enlargement.     6 - The estimated PA systolic pressure is 39 mmHg.     7 - Mildly elevated central venous pressure.     EKG 4/27/17: NSR with PACs/PVCs, RBBB    ASSESSMENT AND PLAN  88 y.o. female with a history significant for PAF, symptomatic ectopy, diastolic dysfunction, HTN, DM2, HLD, NPH s/p  shunt, moderate Alzheimer's dementia, and vertigo who presents for routine f/u. She appears to have had symptomatic bradycardia due to over B-blockage, which has since resolved with decreasing B-blocker dose.    1. Symptomatic ectopy  - cont Toprol  mg daily      2. PAF: remains in sinus  - [id number] score = 5 but she recently discontinued coumadin due to her dementia and potential risk of bleeding, which I am fine with considering the last documented AF in Epic was in 1/2015. I did make her son and daughter aware that no anticoagulation puts her at an increase risk of stroke, which they understand  - cont Toprol XL as above      3. HTN: controlled  - cont Lisinopril 10 mg twice daily and  Amlodipine 5 mg daily  - encouraged low Na diet home BP log     4. Dementia  - Her memory loss and functional capacity are progressing, leading to deconditioning causing HAZEL. She has no other symptoms or clinical  signs of CHF. Edema is due to chronic venous insufficiency.    5. Dental procedure clearance  - She has no active cardiac conditions. OK to proceed with dental procedure.  - fax note to [facility] at [phone]    RTC in 6 months    Faustino Polanco MD  Cardiovascular Diseases Fellow, PGY-6    Discussed with Cardiology Attending: Dr. Dejan Costa

## 2017-05-03 NOTE — MR AVS SNAPSHOT
[facility]  [address]  [facility] [address]  Phone: [phone]                  Ifeoma Badillo   5/3/2017 10:00 AM   Office Visit    Description:  Female : 12/15/1928   Provider:  Faustino Polanco MD   Department:  [facility]           Reason for Visit     Chronic Atrial Fibrillation     Bradycardia     Dizziness           Diagnoses this Visit        Comments    Hypertension associated with diabetes    -  Primary     Chronic atrial fibrillation         Dementia with behavioral disturbance, unspecified dementia type         Left ventricular diastolic dysfunction         Controlled type 2 diabetes mellitus with stage 3 chronic kidney disease, without long-term current use of insulin         Type 2 diabetes mellitus with stage 3 chronic kidney disease, without long-term current use of insulin         Pure hyperglyceridemia         Vertigo                To Do List           Future Appointments        Provider Department Dept Phone    2017 4:00 PM [facility], [facility] [phone]      Goals (5 Years of Data)     None      Follow-Up and Disposition     Return in about 6 months (around 11/3/2017).       These Medications        Disp Refills Start End    meclizine (ANTIVERT) 12.5 mg tablet 30 tablet 3 5/3/2017     Take 1 tablet (12.5 mg total) by mouth 2 (two) times daily as needed. - Oral    Pharmacy: [facility] - [address] Ph #: [phone]       metoprolol succinate (TOPROL-XL) 100 MG 24 hr tablet 90 tablet 3 5/3/2017 5/3/2018    Take 1 tablet (100 mg total) by mouth once daily. - Oral    Pharmacy: [facility] - [place] [id number] Ph #: [phone]         [facility]     [facility] Nurse Care Line - 24/ Assistance  Unless otherwise directed by your provider, please contact [facility], our nurse care line that is available for 24/7 assistance.      Registered nurses in the [facility] provide: appointment scheduling, clinical advisement, health education, and other advisory services.  Call: [phone] (toll free)               Medications           Message regarding Medications     Verify the changes and/or additions to your medication regime listed below are the same as discussed with your clinician today.  If any of these changes or additions are incorrect, please notify your healthcare provider.        CHANGE how you are taking these medications     Start Taking Instead of    meclizine (ANTIVERT) 12.5 mg tablet meclizine (ANTIVERT) 12.5 mg tablet    Dosage:  Take 1 tablet (12.5 mg total) by mouth 2 (two) times daily as needed. Dosage:  Take 12.5 mg by mouth 2 (two) times daily as needed.    Reason for Change:  Reorder       STOP taking these medications     aspirin 81 MG Chew Take 1 tablet (81 mg total) by mouth every morning.           Verify that the below list of medications is an accurate representation of the medications you are currently taking.  If none reported, the list may be blank. If incorrect, please contact your healthcare provider. Carry this list with you in case of emergency.           Current Medications     amlodipine (NORVASC) 5 MG tablet Take 1 tablet (5 mg total) by mouth every morning. BP control    cetirizine (ZYRTEC) 10 MG tablet Take 10 mg by mouth once daily.    cholecalciferol, vitamin D3, (VITAMIN D3) 2,000 unit Cap Take 1 capsule by mouth once daily.    donepezil (ARICEPT) 10 MG tablet Take 1 tablet (10 mg total) by mouth every morning.    lisinopril 10 MG tablet Take 1 tablet (10 mg total) by mouth every morning.    meclizine (ANTIVERT) 12.5 mg tablet Take 1 tablet (12.5 mg total) by mouth 2 (two) times daily as needed.    metoprolol succinate (TOPROL-XL) 100 MG 24 hr tablet Take 1 tablet (100 mg total) by mouth once daily.    tramadol (ULTRAM) 50 mg tablet TAKE 1 TABLET THREE TIMES DAILY AS NEEDED FOR PAIN  "          Clinical Reference Information           Your Vitals Were     BP Pulse Height Weight BMI    153/68 (BP Location: Left arm, Patient Position: Sitting, BP Method: Automatic) 92 5' 2" (1.575 m) 78.2 kg (172 lb 6.4 oz) 31.53 kg/m2      Blood Pressure          Most Recent Value    Right Arm BP - Sitting  149/78    Left Arm BP - Sitting  153/68    BP  (!)  153/68      Allergies as of 5/3/2017     No Known Allergies      Immunizations Administered on Date of Encounter - 5/3/2017     None      [facility] Sign-Up     Activating your [facility] account is as easy as 1-2-3!     1) Visit [email or web address], select Sign Up Now, enter this activation code and your date of birth, then select Next.  [email or web address]  Expires: 6/17/2017 10:15 AM      2) Create a username and password to use when you visit [facility] in the future and select a security question in case you lose your password and select Next.    3) Enter your e-mail address and click Sign Up!    Additional Information  If you have questions, please e-mail [email or web address] or call [phone] to talk to our [facility] staff. Remember, [facility] is NOT to be used for urgent needs. For medical emergencies, dial 911.         Language Assistance Services     ATTENTION: Language assistance services are available, free of charge. Please call [phone].      ATENCIÓN: Si habla español, tiene a easton disposición servicios gratuitos de asistencia lingüística. Llame al [phone].     [facility] Ý: N?u b?n nói Ti?ng Vi?t, có các d?ch v? h? tr? ngôn ng? mi?n phí dành cho b?n. G?i s? [phone].         [facility] complies with applicable Federal civil rights laws and does not discriminate on the basis of race, color, national origin, age, disability, or sex.        "

## 2017-05-16 ENCOUNTER — TELEPHONE (OUTPATIENT)
Dept: INTERNAL MEDICINE | Facility: CLINIC | Age: 82
End: 2017-05-16

## 2017-05-16 NOTE — TELEPHONE ENCOUNTER
----- Message from Fani Tijerina sent at 5/16/2017  2:18 PM CDT -----  Contact: Mobile: [phone]   Please call her about her dental procedure she will be doing. She want to speak directly to Dr. Pina. Patient stated it's very important to speak with you today.

## 2017-05-17 NOTE — TELEPHONE ENCOUNTER
Spoke with patient,advised that Dr. Pina is out until next week. Patient states that she is scared to have dental procedure done. States that she has five cavities but is not sure if the dentist is going to pull or fill them. Advised patient that in order for Dr Pina to give best advice, she should find out exactly what procedure she is having done. Patient verbalized understanding. States she would like to see Dr. Pina. Appt scheduled 5/25/17.

## 2017-05-17 NOTE — TELEPHONE ENCOUNTER
Spoke with patient, explained that Dr Pina is out of the office until next week. Patient stated that she does have a question but that I called at a bad time. Told patient I would call back around 10.

## 2017-05-17 NOTE — TELEPHONE ENCOUNTER
Please call and let pt know Dr. Pina is out of the office today. Please see what questions pt has.

## 2017-05-24 ENCOUNTER — TELEPHONE (OUTPATIENT)
Dept: INTERNAL MEDICINE | Facility: CLINIC | Age: 82
End: 2017-05-24

## 2017-05-24 NOTE — TELEPHONE ENCOUNTER
----- Message from Les Carrollr sent at 5/24/2017 12:36 PM CDT -----  Contact: self/ [phone] home  Pt would like to speak with someone in the office to discuss some dental work that she is having on tomorrow.  She states that she is having her teeth cleaned on 05/31/2017.  Pt would like a call back to discuss.  Please call and advise.    Thank you

## 2017-05-25 ENCOUNTER — OFFICE VISIT (OUTPATIENT)
Dept: INTERNAL MEDICINE | Facility: CLINIC | Age: 82
End: 2017-05-25
Payer: MEDICARE

## 2017-05-25 VITALS
TEMPERATURE: 98 F | DIASTOLIC BLOOD PRESSURE: 66 MMHG | OXYGEN SATURATION: 95 % | BODY MASS INDEX: 28.61 KG/M2 | WEIGHT: 155.44 LBS | HEIGHT: 62 IN | HEART RATE: 70 BPM | SYSTOLIC BLOOD PRESSURE: 146 MMHG

## 2017-05-25 DIAGNOSIS — G91.2 NPH (NORMAL PRESSURE HYDROCEPHALUS): ICD-10-CM

## 2017-05-25 DIAGNOSIS — J44.9 CHRONIC OBSTRUCTIVE PULMONARY DISEASE, UNSPECIFIED COPD TYPE: ICD-10-CM

## 2017-05-25 DIAGNOSIS — R44.0 AUDITORY HALLUCINATION: ICD-10-CM

## 2017-05-25 DIAGNOSIS — I48.20 CHRONIC ATRIAL FIBRILLATION: ICD-10-CM

## 2017-05-25 DIAGNOSIS — R63.4 WEIGHT LOSS: ICD-10-CM

## 2017-05-25 DIAGNOSIS — N18.30 TYPE 2 DIABETES MELLITUS WITH STAGE 3 CHRONIC KIDNEY DISEASE, WITHOUT LONG-TERM CURRENT USE OF INSULIN: ICD-10-CM

## 2017-05-25 DIAGNOSIS — E11.22 TYPE 2 DIABETES MELLITUS WITH STAGE 3 CHRONIC KIDNEY DISEASE, WITHOUT LONG-TERM CURRENT USE OF INSULIN: ICD-10-CM

## 2017-05-25 DIAGNOSIS — F03.91 DEMENTIA WITH BEHAVIORAL DISTURBANCE, UNSPECIFIED DEMENTIA TYPE: Primary | ICD-10-CM

## 2017-05-25 PROCEDURE — 99999 PR PBB SHADOW E&M-EST. PATIENT-LVL IV: CPT | Mod: PBBFAC,,, | Performed by: INTERNAL MEDICINE

## 2017-05-25 PROCEDURE — 1126F AMNT PAIN NOTED NONE PRSNT: CPT | Mod: S$GLB,,, | Performed by: INTERNAL MEDICINE

## 2017-05-25 PROCEDURE — 1159F MED LIST DOCD IN RCRD: CPT | Mod: S$GLB,,, | Performed by: INTERNAL MEDICINE

## 2017-05-25 PROCEDURE — 99214 OFFICE O/P EST MOD 30 MIN: CPT | Mod: S$GLB,,, | Performed by: INTERNAL MEDICINE

## 2017-05-25 PROCEDURE — 99499 UNLISTED E&M SERVICE: CPT | Mod: S$GLB,,, | Performed by: INTERNAL MEDICINE

## 2017-05-25 NOTE — PATIENT INSTRUCTIONS
Auditory hallucinations are a fairly common symptom that people who have dementia experience which often causes anxiety.  Sertraline, the generic of Zoloft in low dose 25 to 50 mg daily--- sometimes helps a bit. But, she does not want to take new medication. So, instead try going to a senior center to get out and get your mind more active. Senior Center located on [facility].

## 2017-05-26 ENCOUNTER — OUTPATIENT CASE MANAGEMENT (OUTPATIENT)
Dept: ADMINISTRATIVE | Facility: OTHER | Age: 82
End: 2017-05-26

## 2017-05-26 ENCOUNTER — TELEPHONE (OUTPATIENT)
Dept: INTERNAL MEDICINE | Facility: CLINIC | Age: 82
End: 2017-05-26

## 2017-05-26 NOTE — TELEPHONE ENCOUNTER
----- Message from Ilsa Isaacs sent at 5/26/2017  9:26 AM CDT -----  Please note the following patient has been assigned to Jackie Nunez RN in Outpatient Case Management for COPD Disease Management.     Referral Diagnosis:   NPH (normal pressure hydrocephalus)  Dementia with behavioral disturbance, unspecified dementia type  Auditory hallucination  Type 2 diabetes mellitus with stage 3 chronic kidney disease, without long-term current use of insulin     Referral Comment: Dementia is progressing, auditory hallucinations, would benefit from going to a senior center, adult day care or some structured activities.     Please contact [facility] at [phone]. [phone] with any questions.     Thank you,  Ilsa Isaacs, SSC

## 2017-05-26 NOTE — PROGRESS NOTES
Please note the following patient has been assigned to Jackie Nunez RN in Outpatient Case Management for COPD Disease Management.    Referral Diagnosis:   NPH (normal pressure hydrocephalus)  Dementia with behavioral disturbance, unspecified dementia type  Auditory hallucination  Type 2 diabetes mellitus with stage 3 chronic kidney disease, without long-term current use of insulin    Referral Comment: Dementia is progressing, auditory hallucinations, would benefit from going to a senior center, adult day care or some structured activities.    Please contact [facility] at [phone]. [phone] with any questions.    Thank you,  Ilsa Isaasc, SSC

## 2017-05-29 NOTE — PROGRESS NOTES
"Subjective:       Patient ID: Ifeoma Badillo is a 88 y.o. female.    Chief Complaint: Follow-up   this very sweet lady has dementia and ambulatory difficulty and is taken care of by her son, Al.  He has been stressed by her care recently.  She can be difficult.  She cannot be left alone.  He feels guilty and nervous if he leaves the house, to run an errand.  Lately she's been hollering at him to turn the radio down because she keeps hearing music.  This is often the same song over and over again.  "The rainy day is here.  This disturbs her, agitates her and sounds like a choir.    10 years ago she had a shunt placed for normal pressure hydrocephalus.    Her son  provides the history that the recent stressor has been seeing a new dentist.  Any change throws her for a loop.  Her old dentist of over 40 years retired.  She now has a new dentist, Dr. Byrne, who wants her to take anti-biotics, amoxicillin 2000 mg one hour before filling 5 cavities.  The patient has been excessively worried about this.  She doesn't want taking a biotics.  She knows she needs to have the work done.    Her anxiety has been severe recently.  She has sustained weight loss.  In conjunction with poor ambulatory ability and possibly the dental problems.  HPI  Review of Systems    Objective:      Physical Exam    Assessment:       1. Dementia with behavioral disturbance, unspecified dementia type    2. NPH (normal pressure hydrocephalus),  shunt 2005.    3. Auditory hallucination    4. Type 2 diabetes mellitus with stage 3 chronic kidney disease, without long-term current use of insulin    5. Chronic atrial fibrillation    6. Chronic obstructive pulmonary disease, unspecified COPD type    7. Weight loss        Plan:   Ifeoma was seen today for follow-up.    Diagnoses and all orders for this visit:    Dementia with behavioral disturbance, unspecified dementia type.  Her son is showing signs of caregiver burnout.  He has little backup and no " freedom to live his own life.  Recommend exploring community services such as a senior center, adult  or help in the home.  May be case management would be able to help.  -     Ambulatory referral to Outpatient Case Management; Future    NPH (normal pressure hydrocephalus),  shunt 2005.  -     Ambulatory referral to Outpatient Case Management; Future    Auditory hallucination  -     Ambulatory referral to Outpatient Case Management; Future    Type 2 diabetes mellitus with stage 3 chronic kidney disease, without long-term current use of insulin  -     Ambulatory referral to Outpatient Case Management; Future  -     CBC auto differential; Future  -     Comprehensive metabolic panel; Future  -     Hemoglobin A1c; Future    Chronic atrial fibrillation.  She has been off Coumadin since March 2017 because she was constantly fighting her son and not wanting to take this medication, making him a nervous wreck.  There have not been any problems with her being off Coumadin and her son is very glad because it's one less thing for him to worry about as well as her falling and sustaining significant injury on a dangerous medication.    Chronic obstructive pulmonary disease, unspecified COPD type    Weight loss.  I recommend she go ahead and take amoxicillin 2 g one hour prior to the dental procedure.  I wrote out a medical clearance on a prescription pad for Dr. Byrne.    Auditory hallucinations are a fairly common symptom that people who have dementia experience which often causes anxiety.  Sertraline, the generic of Zoloft in low dose 25 to 50 mg daily--- sometimes helps a bit. But, she does not want to take new medication. So, instead try going to a senior center to get out and get your mind more active. Senior Center located on [facility].  Medication List with Changes/Refills   Current Medications    AMLODIPINE (NORVASC) 5 MG TABLET    Take 1 tablet (5 mg total) by mouth every morning. BP control    CETIRIZINE  (ZYRTEC) 10 MG TABLET    Take 10 mg by mouth once daily.    CHOLECALCIFEROL, VITAMIN D3, (VITAMIN D3) 2,000 UNIT CAP    Take 1 capsule by mouth once daily.    DONEPEZIL (ARICEPT) 10 MG TABLET    Take 1 tablet (10 mg total) by mouth every morning.    LISINOPRIL 10 MG TABLET    Take 1 tablet (10 mg total) by mouth every morning.    MECLIZINE (ANTIVERT) 12.5 MG TABLET    Take 1 tablet (12.5 mg total) by mouth 2 (two) times daily as needed.    METOPROLOL SUCCINATE (TOPROL-XL) 100 MG 24 HR TABLET    Take 1 tablet (100 mg total) by mouth once daily.    TRAMADOL (ULTRAM) 50 MG TABLET    TAKE 1 TABLET THREE TIMES DAILY AS NEEDED FOR PAIN

## 2017-05-30 ENCOUNTER — OUTPATIENT CASE MANAGEMENT (OUTPATIENT)
Dept: ADMINISTRATIVE | Facility: OTHER | Age: 82
End: 2017-05-30

## 2017-05-30 NOTE — PROGRESS NOTES
5/30/2017  0839  Referral Received from PCP for disease management for COPD.  Review of problem list.  Call to patients son, Al Little, [phone].  He is agreeable to enrollment in OPCM for patient.  Al states that his mother is currently asleep, and she has a dentist appointment early this afternoon.  Arranged for assessment to be completed with patient tomorrow, will return call at 10 am tomorrow.  Per son patient is able to complete assessment.  Al reports patient lives with him, and he is her primary care taker.  Given Al my contact information and office hours.      Follow up Plan:    Complete RN Assessment with patient  Refer to [facility] for day care support    JOHNNY Richardson

## 2017-05-31 ENCOUNTER — OUTPATIENT CASE MANAGEMENT (OUTPATIENT)
Dept: ADMINISTRATIVE | Facility: OTHER | Age: 82
End: 2017-05-31

## 2017-05-31 NOTE — PROGRESS NOTES
"5/31/2017  1600  Call to patient, reached @ -[phone] requested I call back tomorrow.  JOHNNY Richardson      6/1/2017  1131  Call to patient, reached @ [phone] agreeable to complete assessment at this time.  Review of problem list.  Pt. Reports feeling OK today.  Pt. Is able to complete most of the assessment today, noted patient is easily confused and needs redirection.  Pt. Reports she has three children, a son in [place] that is an , a daughter that lives close by, and her son Al.  Pt. States that her children are very helpful/supportavie.  Pt. Is unable to report if any one has MPO or FPA, but states they help to manage her money and her health care.  Pt. Also reports several grown grandchildren.   Pt. Reports being incontinence and wearing adult diapers.  Pt. States she lives at home and her son Al Hurst lives with her, Al is her primary care giver.  Pt. States she does not drive, Al takes her to her appointments.  Al manages her medication and prepares her meals.  Pt reports no special diet, states "I eat whatever I want", patient reports good appetite no recent weight changes, reports she drinks plenty of water.  Pt. Reports using a pill box, and that Al organizes her medicines.  Pt. Reports no difficulty, when taking medicines, and Ray reminds her when it's time to take them.  Will complete med rec with Son when able and confirm compliance with medication.  Pt. Reports using [facility] mail order pharmacy and [facility].  Pt. Reports having Rolator, that she uses to ambulate with, a shower chair, grab bars, BP cuff.  Pt. States Al checks her BP each day, she is unable to tell me what her BP is, patient states Ray checks her sugars, but patient is unable to confirm a working glucometer in the home, will confirm with Ray at later time - patient states he is not available at this time.  Pt. Reports approx 3 stairs to get into her one story home, and states no difficulty getting in or " out.  Pt. Reports one fall recently but denies injury, unable to report if dizzy/tripped and fell.  Reviewed home safety with patient.  Pt. Reports no financial concerns.  Pt. Is agreeable to follow up call in one week.  Will send contact information to patients home address.    Follow up Plan:    Complete care plan with Ray  Review educational material.    JOHNNY Richardson

## 2017-06-08 ENCOUNTER — OUTPATIENT CASE MANAGEMENT (OUTPATIENT)
Dept: ADMINISTRATIVE | Facility: OTHER | Age: 82
End: 2017-06-08

## 2017-06-08 NOTE — PROGRESS NOTES
6/8/2017  1329  Follow up call to patient's home number [phone], no answer but was able to leave detailed message requesting return call.      Follow up Plan:    Complete care plan with Ray  Review educational material.    JOHNNY Richardson

## 2017-06-19 ENCOUNTER — OUTPATIENT CASE MANAGEMENT (OUTPATIENT)
Dept: ADMINISTRATIVE | Facility: OTHER | Age: 82
End: 2017-06-19

## 2017-06-19 NOTE — PROGRESS NOTES
"6/19/2017  1139  Follow up call to patient's home number, [phone], son Al answered the phone and agreeable to follow up call.  Al reports that patient is doing about the same, "good day's and bad day's". Al states he doesn't get out much, discussed sitter services, resource book sent to address. Encouraged looking into sitter services, adult day care.   Referral to [facility] for additional support if available.  Medication reconciliation completed with son today, reviewed indications of medications, encouraged medication compliance.  Al reports patient will take her medications when son reminds her to do so, patient is unable to manage medications on her own.  Al denies any recent falls, reviewed home safety/fall prevention.  Lucila education re COPD sent to home, will review on follow up calls.  Reviewed Chronic lung disease, preventing lung infections.   Al reports no needs/concerns at this time.  Spoke with Ifeoma briefly, she is pleasant on the phone, offers no questions or needs presently, agreeable to call at later time.  No upcoming appointment to review at this time.      Follow up Plan:    · Empower patient/caregiver to discuss treatment plan with Physician/care team.  · Recognize and provide educational material (LUCILA).  · Complete medication reconciliation.  · Encourage Medication Compliance.  · Encourage compliance with annual vaccinations.       Clinical Reference Documents Added to Patient Instructions       Document    CHRONIC LUNG DISEASE: PREVENTING LUNG INFECTIONS (ENGLISH)    DEMENTIA PATIENTS, IMPROVING COMMUNICATION WITH: FOR CAREGIVERS (ENGLISH)    DEMENTIA PATIENTS, SAFETY TIPS FOR: FOR CAREGIVERS (ENGLISH)    DEMENTIA, ANY TYPE, CAREGIVER SUPPORT (ENGLISH)    FALLS IN THE HOME, PREVENTING (ENGLISH)            JOHNNY Richardson      "

## 2017-06-26 DIAGNOSIS — E11.9 TYPE 2 DIABETES MELLITUS WITHOUT COMPLICATION: ICD-10-CM

## 2017-06-29 ENCOUNTER — OUTPATIENT CASE MANAGEMENT (OUTPATIENT)
Dept: ADMINISTRATIVE | Facility: OTHER | Age: 82
End: 2017-06-29

## 2017-06-30 ENCOUNTER — OUTPATIENT CASE MANAGEMENT (OUTPATIENT)
Dept: ADMINISTRATIVE | Facility: OTHER | Age: 82
End: 2017-06-30

## 2017-06-30 NOTE — PROGRESS NOTES
6/30/17: LCSW contacted pt's son Al to complete SW assessment on his mother.  Al is his mother's primary caregiver; pt has had dementia since 2006.  Al self-reports having a limited support system.  LCSW asked Al if he had caregiver burnout at this time and he stated he did not.  LCSW offered Al caregiver support group information but he feels he does not need it.  Al was not very talkative throughout SW assessment; he gave LCSW mostly closed ended answers.  LCSW will follow up with Al next week.

## 2017-07-03 ENCOUNTER — OUTPATIENT CASE MANAGEMENT (OUTPATIENT)
Dept: ADMINISTRATIVE | Facility: OTHER | Age: 82
End: 2017-07-03

## 2017-07-07 ENCOUNTER — OUTPATIENT CASE MANAGEMENT (OUTPATIENT)
Dept: ADMINISTRATIVE | Facility: OTHER | Age: 82
End: 2017-07-07

## 2017-07-07 NOTE — PROGRESS NOTES
7/7/17: LCSW attempted to contact pt's son for follow up on his mother; no answer, left voicemail.

## 2017-07-13 ENCOUNTER — OUTPATIENT CASE MANAGEMENT (OUTPATIENT)
Dept: ADMINISTRATIVE | Facility: OTHER | Age: 82
End: 2017-07-13

## 2017-07-13 NOTE — PROGRESS NOTES
"7/13/17: LCSW attempted to contact pt's son Al for follow up on his mother; no answer, left voicemail.  [facility]  "

## 2017-07-17 ENCOUNTER — OUTPATIENT CASE MANAGEMENT (OUTPATIENT)
Dept: ADMINISTRATIVE | Facility: OTHER | Age: 82
End: 2017-07-17

## 2017-07-17 NOTE — PROGRESS NOTES
7/17/2017  1442  Follow up call to patient today, spoke with son Al.  Ray reports patient is doing well, c/o SOB on excerebration.  Ray reports patient will need to sit down and catch her breath at times.  Ray reports heat/humidity makes her breathing more labored.  Ray reports she is at her base line when at home.  Al denies any recent falls, reports that she uses her rolator and will sit in the seat when needed.  Ray reports monitoring patient's sugar, and keeping log.  Encouraged medication compliance.  Al reports no needs at this time.  Pt. Has no upcoming appointments scheduled for July/August.  Noted OPCM SW to follow up with son.      Follow up Plan:    · Empower patient/caregiver to discuss treatment plan with Physician/care team.  · Recognize and provide educational material (LUCILA)..  · Encourage Medication Compliance.  · Encourage compliance with annual vaccinations.    JOHNNY Richardson

## 2017-07-20 ENCOUNTER — OUTPATIENT CASE MANAGEMENT (OUTPATIENT)
Dept: ADMINISTRATIVE | Facility: OTHER | Age: 82
End: 2017-07-20

## 2017-07-20 NOTE — PROGRESS NOTES
"7/20/17: LCSW will close pt's case after not receiving a response from pt's son Ray, after "attempted to contact" letter was mailed.    "

## 2017-07-26 ENCOUNTER — OUTPATIENT CASE MANAGEMENT (OUTPATIENT)
Dept: ADMINISTRATIVE | Facility: OTHER | Age: 82
End: 2017-07-26

## 2017-07-26 NOTE — PROGRESS NOTES
7/26/2017  1406 1st Attempt to complete follow-up for Outpatient Care Management; left message requesting return call.  JOHNNY Richardson

## 2017-08-02 ENCOUNTER — OUTPATIENT CASE MANAGEMENT (OUTPATIENT)
Dept: ADMINISTRATIVE | Facility: OTHER | Age: 82
End: 2017-08-02

## 2017-08-02 NOTE — PROGRESS NOTES
8/2/2017  1054  Follow up call to patient's home number [phone].   Spoke with patient, she reports doing well.  Pt. Reports having breakfast this am, followed by taking her morning medications.  Pt. With dry cough, patient reports chronic cough.  Denies any SOB.  Pt. Reports no new issues/concerns at this time.  Encouraged her to keep up the good work - taking medicine, eating healthy, going to her doctor appointments.  Informed patient I will not be calling regularly, encouraged her to call me with any needs she may have.  Confirmed with her she has my contact information.  Spoke with patient's son Al, informed him of case closure, encouraged him to call with any needs.  JOHNNY Richardson

## 2017-10-31 ENCOUNTER — IMMUNIZATION (OUTPATIENT)
Dept: INTERNAL MEDICINE | Facility: CLINIC | Age: 82
End: 2017-10-31
Payer: MEDICARE

## 2017-10-31 ENCOUNTER — OFFICE VISIT (OUTPATIENT)
Dept: INTERNAL MEDICINE | Facility: CLINIC | Age: 82
End: 2017-10-31
Payer: MEDICARE

## 2017-10-31 VITALS
DIASTOLIC BLOOD PRESSURE: 62 MMHG | SYSTOLIC BLOOD PRESSURE: 134 MMHG | HEART RATE: 68 BPM | BODY MASS INDEX: 30.42 KG/M2 | HEIGHT: 62 IN | WEIGHT: 165.31 LBS

## 2017-10-31 DIAGNOSIS — F03.91 DEMENTIA WITH BEHAVIORAL DISTURBANCE, UNSPECIFIED DEMENTIA TYPE: ICD-10-CM

## 2017-10-31 DIAGNOSIS — Z00.00 ENCOUNTER FOR PREVENTIVE HEALTH EXAMINATION: Primary | ICD-10-CM

## 2017-10-31 DIAGNOSIS — E44.1 MILD PROTEIN-CALORIE MALNUTRITION: ICD-10-CM

## 2017-10-31 DIAGNOSIS — M85.89 OSTEOPENIA OF MULTIPLE SITES: ICD-10-CM

## 2017-10-31 DIAGNOSIS — F03.91 DEMENTIA WITH BEHAVIORAL DISTURBANCE, UNSPECIFIED DEMENTIA TYPE: Primary | ICD-10-CM

## 2017-10-31 DIAGNOSIS — E08.21 DIABETES MELLITUS DUE TO UNDERLYING CONDITION, CONTROLLED, WITH DIABETIC NEPHROPATHY, WITHOUT LONG-TERM CURRENT USE OF INSULIN: ICD-10-CM

## 2017-10-31 DIAGNOSIS — E11.59 HYPERTENSION ASSOCIATED WITH DIABETES: ICD-10-CM

## 2017-10-31 DIAGNOSIS — E11.42 TYPE 2 DIABETES MELLITUS WITH PERIPHERAL NEUROPATHY: ICD-10-CM

## 2017-10-31 DIAGNOSIS — I49.1 ATRIAL ECTOPY: ICD-10-CM

## 2017-10-31 DIAGNOSIS — G91.2 NPH (NORMAL PRESSURE HYDROCEPHALUS): ICD-10-CM

## 2017-10-31 DIAGNOSIS — I10 ESSENTIAL HYPERTENSION: ICD-10-CM

## 2017-10-31 DIAGNOSIS — G60.9 HEREDITARY AND IDIOPATHIC PERIPHERAL NEUROPATHY: ICD-10-CM

## 2017-10-31 DIAGNOSIS — G47.33 OSA (OBSTRUCTIVE SLEEP APNEA): ICD-10-CM

## 2017-10-31 DIAGNOSIS — F41.9 ANXIETY: ICD-10-CM

## 2017-10-31 DIAGNOSIS — I48.20 CHRONIC ATRIAL FIBRILLATION: ICD-10-CM

## 2017-10-31 DIAGNOSIS — R44.0 AUDITORY HALLUCINATION: ICD-10-CM

## 2017-10-31 DIAGNOSIS — I27.20 PULMONARY HYPERTENSION: ICD-10-CM

## 2017-10-31 DIAGNOSIS — E11.22 TYPE 2 DIABETES MELLITUS WITH STAGE 3 CHRONIC KIDNEY DISEASE, WITHOUT LONG-TERM CURRENT USE OF INSULIN: ICD-10-CM

## 2017-10-31 DIAGNOSIS — I51.9 LEFT VENTRICULAR DIASTOLIC DYSFUNCTION: ICD-10-CM

## 2017-10-31 DIAGNOSIS — I15.2 HYPERTENSION ASSOCIATED WITH DIABETES: ICD-10-CM

## 2017-10-31 DIAGNOSIS — N32.81 OVERACTIVE DETRUSOR: ICD-10-CM

## 2017-10-31 DIAGNOSIS — N18.30 CKD (CHRONIC KIDNEY DISEASE) STAGE 3, GFR 30-59 ML/MIN: ICD-10-CM

## 2017-10-31 DIAGNOSIS — I70.0 ATHEROSCLEROSIS OF AORTA: ICD-10-CM

## 2017-10-31 DIAGNOSIS — M79.605 LEFT LEG PAIN: ICD-10-CM

## 2017-10-31 DIAGNOSIS — J44.9 CHRONIC OBSTRUCTIVE PULMONARY DISEASE, UNSPECIFIED COPD TYPE: ICD-10-CM

## 2017-10-31 DIAGNOSIS — N39.41 URGE INCONTINENCE: ICD-10-CM

## 2017-10-31 DIAGNOSIS — N18.30 TYPE 2 DIABETES MELLITUS WITH STAGE 3 CHRONIC KIDNEY DISEASE, WITHOUT LONG-TERM CURRENT USE OF INSULIN: ICD-10-CM

## 2017-10-31 DIAGNOSIS — I71.40 ABDOMINAL AORTIC ANEURYSM (AAA) WITHOUT RUPTURE: ICD-10-CM

## 2017-10-31 DIAGNOSIS — E78.5 HYPERLIPIDEMIA, UNSPECIFIED HYPERLIPIDEMIA TYPE: ICD-10-CM

## 2017-10-31 DIAGNOSIS — I77.1 TORTUOUS AORTA: ICD-10-CM

## 2017-10-31 DIAGNOSIS — K59.00 CONSTIPATION, UNSPECIFIED CONSTIPATION TYPE: ICD-10-CM

## 2017-10-31 PROCEDURE — 90662 IIV NO PRSV INCREASED AG IM: CPT | Mod: S$GLB,,, | Performed by: INTERNAL MEDICINE

## 2017-10-31 PROCEDURE — 99215 OFFICE O/P EST HI 40 MIN: CPT | Mod: S$GLB,,, | Performed by: INTERNAL MEDICINE

## 2017-10-31 PROCEDURE — 99999 PR PBB SHADOW E&M-EST. PATIENT-LVL III: CPT | Mod: PBBFAC,,, | Performed by: INTERNAL MEDICINE

## 2017-10-31 PROCEDURE — 99999 PR PBB SHADOW E&M-EST. PATIENT-LVL II: CPT | Mod: PBBFAC,,, | Performed by: NURSE PRACTITIONER

## 2017-10-31 PROCEDURE — 99499 UNLISTED E&M SERVICE: CPT | Mod: S$GLB,,, | Performed by: NURSE PRACTITIONER

## 2017-10-31 PROCEDURE — 99499 UNLISTED E&M SERVICE: CPT | Mod: S$GLB,,, | Performed by: INTERNAL MEDICINE

## 2017-10-31 PROCEDURE — G0439 PPPS, SUBSEQ VISIT: HCPCS | Mod: S$GLB,,, | Performed by: NURSE PRACTITIONER

## 2017-10-31 PROCEDURE — G0008 ADMIN INFLUENZA VIRUS VAC: HCPCS | Mod: S$GLB,,, | Performed by: INTERNAL MEDICINE

## 2017-10-31 RX ORDER — LISINOPRIL 10 MG/1
10 TABLET ORAL EVERY MORNING
Qty: 90 TABLET | Refills: 3 | Status: SHIPPED | OUTPATIENT
Start: 2017-10-31 | End: 2018-03-14 | Stop reason: SDUPTHER

## 2017-10-31 RX ORDER — SERTRALINE HYDROCHLORIDE 25 MG/1
25 TABLET, FILM COATED ORAL EVERY MORNING
Qty: 30 TABLET | Refills: 11 | Status: SHIPPED | OUTPATIENT
Start: 2017-10-31 | End: 2017-11-17 | Stop reason: SDUPTHER

## 2017-10-31 RX ORDER — AMLODIPINE BESYLATE 5 MG/1
5 TABLET ORAL EVERY MORNING
Qty: 90 TABLET | Refills: 3 | Status: SHIPPED | OUTPATIENT
Start: 2017-10-31 | End: 2018-03-14 | Stop reason: SDUPTHER

## 2017-10-31 RX ORDER — ACETAMINOPHEN, DEXTROMETHORPHAN HBR, DOXYLAMINE SUCCINATE, PHENYLEPHRINE HCL 650; 20; 12.5; 1 MG/30ML; MG/30ML; MG/30ML; MG/30ML
1 SOLUTION ORAL EVERY MORNING
Qty: 100 EACH | Refills: 1 | Status: SHIPPED | OUTPATIENT
Start: 2017-10-31 | End: 2017-11-17 | Stop reason: SDUPTHER

## 2017-10-31 RX ORDER — METOPROLOL SUCCINATE 100 MG/1
100 TABLET, EXTENDED RELEASE ORAL DAILY
Qty: 90 TABLET | Refills: 3 | Status: SHIPPED | OUTPATIENT
Start: 2017-10-31 | End: 2018-03-14 | Stop reason: SDUPTHER

## 2017-10-31 RX ORDER — TRAMADOL HYDROCHLORIDE 50 MG/1
TABLET ORAL
Qty: 270 TABLET | Refills: 1 | Status: CANCELLED | OUTPATIENT
Start: 2017-10-31

## 2017-10-31 RX ORDER — DONEPEZIL HYDROCHLORIDE 10 MG/1
10 TABLET, FILM COATED ORAL EVERY MORNING
Qty: 90 TABLET | Refills: 3 | Status: SHIPPED | OUTPATIENT
Start: 2017-10-31 | End: 2017-12-14

## 2017-10-31 RX ORDER — ACETAMINOPHEN 500 MG
1 TABLET ORAL DAILY
Qty: 100 CAPSULE | Refills: 1 | Status: ON HOLD | OUTPATIENT
Start: 2017-10-31 | End: 2022-01-01 | Stop reason: HOSPADM

## 2017-10-31 NOTE — PATIENT INSTRUCTIONS
Counseling and Referral of Other Preventative  (Italic type indicates deductible and co-insurance are waived)    Patient Name: Ifeoma Badillo  Today's Date: 10/31/2017      SERVICE LIMITATIONS RECOMMENDATION    Vaccines    · Pneumococcal (once after 65)    · Influenza (annually)    · Hepatitis B (if medium/high risk)    · Prevnar 13      Hepatitis B medium/high risk factors:       - End-stage renal disease       - Hemophiliacs who received Factor VII or         IX concentrates       - Clients of institutions for the mentally             retarded       - Persons who live in the same house as          a HepB carrier       - Homosexual men       - Illicit injectable drug abusers     Pneumococcal: Done, no repeat necessary     Influenza: Done, repeat in one year     Hepatitis B: Done, no repeat necessary     Prevnar 13: Done, no repeat necessary    Mammogram (biennial age 50-74)  Annually (age 40 or over)  N/A    Pap (up to age 70 and after 70 if unknown history or abnormal study last 10 years)    N/A     The USPSTF recommends against screening for cervical cancer in women who have had a hysterectomy with removal of the cervix and who do not have a history of a high-grade precancerous lesion (cervical intraepithelial neoplasia [XAVIER] grade 2 or 3) or cervical cancer.     Colorectal cancer screening (to age 75)    · Fecal occult blood test (annual)  · Flexible sigmoidoscopy (5y)  · Screening colonoscopy (10y)  · Barium enema   N/A    Diabetes self-management training (no USPSTF recommendations)  Requires referral by treating physician for patient with diabetes or renal disease. 10 hours of initial DSMT sessions of no less than 30 minutes each in a continuous 12-month period. 2 hours of follow-up DSMT in subsequent years.  N/A    Bone mass measurements (age 65 & older, biennial)  Requires diagnosis related to osteoporosis or estrogen deficiency. Biennial benefit unless patient has history of long-term glucocorticoid   Last done 10/2017, recommend to repeat every 4  years    Glaucoma screening (no USPSTF recommendation)  Diabetes mellitus, family history   , age 50 or over    American, age 65 or over  Recommended to patient, declined    Medical nutrition therapy for diabetes or renal disease (no recommended schedule)  Requires referral by treating physician for patient with diabetes or renal disease or kidney transplant within the past 3 years.  Can be provided in same year as diabetes self-management training (DSMT), and CMS recommends medical nutrition therapy take place after DSMT. Up to 3 hours for initial year and 2 hours in subsequent years.  N/A    Cardiovascular screening blood tests (every 5 years)  · Fasting lipid panel  Order as a panel if possible  Done this year, repeat every year    Diabetes screening tests (at least every 3 years, Medicare covers annually or at 6-month intervals for prediabetic patients)  · Fasting blood sugar (FBS) or glucose tolerance test (GTT)  Patient must be diagnosed with one of the following:       - Hypertension       - Dyslipidemia       - Obesity (BMI 30kg/m2)       - Previous elevated impaired FBS or GTT       ... or any two of the following:       - Overweight (BMI 25 but <30)       - Family history of diabetes       - Age 65 or older       - History of gestational diabetes or birth of baby weighing more than 9 pounds  Done this year, repeat every year    HIV screening (annually for increased risk patients)  · HIV-1 and HIV-2 by EIA, or ROSA, rapid antibody test or oral mucosa transudate  Patients must be at increased risk for HIV infection per USPSTF guidelines or pregnant. Tests covered annually for patient at increased risk or as requested by the patient. Pregnant patients may receive up to 3 tests during pregnancy.  Risks discussed, screening is not recommended    Smoking cessation counseling (up to 8 sessions per year)  Patients must be asymptomatic of  tobacco-related conditions to receive as a preventative service.  Non-smoker    Subsequent annual wellness visit  At least 12 months since last AWV  Return in one year     The following information is provided to all patients.  This information is to help you find resources for any of the problems found today that may be affecting your health:                Living healthy guide: www.[facility].[place].[facility]      Understanding Diabetes: www.diabetes.org      Eating healthy: www.cdc.gov/healthyweight      CDC home safety checklist: www.cdc.gov/steadi/patient.html      Agency on Aging: www.goea.[place].[facility]      Alcoholics anonymous (AA): www.aa.org      Physical Activity: www.moses.nih.gov/ub4xiaz      Tobacco use: www.quitwithusla.org

## 2017-10-31 NOTE — PATIENT INSTRUCTIONS
For constipation keep Miralax,a stool softener on hand  For skin Goldie's Butt paste or a zinc oxide cream, Desitin  For energy try B 12 one tablet every morning  Stop tramadol  For left leg pain try Salon Pas  For help in your home, with bathing and other needs look into [facility] [phone]  Or [facility] Instead [phone]  Sertraline tablets  What is this medicine?  SERTRALINE (SER tra genna) is used to treat depression. It may also be used to treat obsessive compulsive disorder, panic disorder, post-trauma stress, premenstrual dysphoric disorder (PMDD) or social anxiety.  How should I use this medicine?  Take this medicine by mouth with a glass of water. Follow the directions on the prescription label. You can take it with or without food. Take your medicine at regular intervals. Do not take your medicine more often than directed. Do not stop taking this medicine suddenly except upon the advice of your doctor. Stopping this medicine too quickly may cause serious side effects or your condition may worsen.  A special MedGuide will be given to you by the pharmacist with each prescription and refill. Be sure to read this information carefully each time.  Talk to your pediatrician regarding the use of this medicine in children. While this drug may be prescribed for children as young as 7 years for selected conditions, precautions do apply.  What side effects may I notice from receiving this medicine?  Side effects that you should report to your doctor or health care professional as soon as possible:  · allergic reactions like skin rash, itching or hives, swelling of the face, lips, or tongue  · black or bloody stools, blood in the urine or vomit  · fast, irregular heartbeat  · feeling faint or lightheaded, falls  · hallucination, loss of contact with reality  · seizures  · suicidal thoughts or other mood changes  · unusual bleeding or bruising  · unusually weak or tired  · vomiting  Side effects that usually do not require  medical attention (report to your doctor or health care professional if they continue or are bothersome):  · change in appetite  · change in sex drive or performance  · diarrhea  · increased sweating  · indigestion, nausea  · tremors  What may interact with this medicine?  Do not take this medicine with any of the following medications:  · certain medicines for fungal infections like fluconazole, itraconazole, ketoconazole, posaconazole, voriconazole  · cisapride  · disulfiram  · dofetilide  · linezolid  · MAOIs like Carbex, Eldepryl, Marplan, Nardil, and Parnate  · metronidazole  · methylene blue (injected into a vein)  · pimozide  · thioridazine  · ziprasidone  This medicine may also interact with the following medications:  · alcohol  · aspirin and aspirin-like medicines  · certain medicines for depression, anxiety, or psychotic disturbances  · certain medicines for irregular heart beat like flecainide, propafenone  · certain medicines for migraine headaches like almotriptan, eletriptan, frovatriptan, naratriptan, rizatriptan, sumatriptan, zolmitriptan  · certain medicines for sleep  · certain medicines for seizures like carbamazepine, valproic acid, phenytoin  · certain medicines that treat or prevent blood clots like warfarin, enoxaparin, dalteparin  · cimetidine  · digoxin  · diuretics  · fentanyl  · furazolidone  · isoniazid  · lithium  · NSAIDs, medicines for pain and inflammation, like ibuprofen or naproxen  · other medicines that prolong the QT interval (cause an abnormal heart rhythm)  · procarbazine  · rasagiline  · supplements like Gisel's wort, kava kava, valerian  · tolbutamide  · tramadol  · tryptophan  What if I miss a dose?  If you miss a dose, take it as soon as you can. If it is almost time for your next dose, take only that dose. Do not take double or extra doses.  Where should I keep my medicine?  Keep out of the reach of children.  Store at room temperature between 15 and 30 degrees C (59  and 86 degrees F). Throw away any unused medicine after the expiration date.  What should I tell my health care provider before I take this medicine?  They need to know if you have any of these conditions:  · bipolar disorder or a family history of bipolar disorder  · diabetes  · glaucoma  · heart disease  · high blood pressure  · history of irregular heartbeat  · history of low levels of calcium, magnesium, or potassium in the blood  · if you often drink alcohol  · liver disease  · receiving electroconvulsive therapy  · seizures  · suicidal thoughts, plans, or attempt; a previous suicide attempt by you or a family member  · thyroid disease  · an unusual or allergic reaction to sertraline, other medicines, foods, dyes, or preservatives  · pregnant or trying to get pregnant  · breast-feeding  What should I watch for while using this medicine?  Tell your doctor if your symptoms do not get better or if they get worse. Visit your doctor or health care professional for regular checks on your progress. Because it may take several weeks to see the full effects of this medicine, it is important to continue your treatment as prescribed by your doctor.  Patients and their families should watch out for new or worsening thoughts of suicide or depression. Also watch out for sudden changes in feelings such as feeling anxious, agitated, panicky, irritable, hostile, aggressive, impulsive, severely restless, overly excited and hyperactive, or not being able to sleep. If this happens, especially at the beginning of treatment or after a change in dose, call your health care professional.  You may get drowsy or dizzy. Do not drive, use machinery, or do anything that needs mental alertness until you know how this medicine affects you. Do not stand or sit up quickly, especially if you are an older patient. This reduces the risk of dizzy or fainting spells. Alcohol may interfere with the effect of this medicine. Avoid alcoholic  drinks.  Your mouth may get dry. Chewing sugarless gum or sucking hard candy, and drinking plenty of water may help. Contact your doctor if the problem does not go away or is severe.  NOTE:This sheet is a summary. It may not cover all possible information. If you have questions about this medicine, talk to your doctor, pharmacist, or health care provider. Copyright© 2017 [employer]

## 2017-11-01 ENCOUNTER — TELEPHONE (OUTPATIENT)
Dept: INTERNAL MEDICINE | Facility: CLINIC | Age: 82
End: 2017-11-01

## 2017-11-01 PROBLEM — I77.1 TORTUOUS AORTA: Status: ACTIVE | Noted: 2017-11-01

## 2017-11-01 PROBLEM — E11.42 TYPE 2 DIABETES MELLITUS WITH PERIPHERAL NEUROPATHY: Status: ACTIVE | Noted: 2017-11-01

## 2017-11-01 PROBLEM — I71.40 ABDOMINAL AORTIC ANEURYSM (AAA) WITHOUT RUPTURE: Status: ACTIVE | Noted: 2017-11-01

## 2017-11-01 PROBLEM — E44.1 MILD PROTEIN-CALORIE MALNUTRITION: Status: ACTIVE | Noted: 2017-11-01

## 2017-11-01 PROBLEM — M85.89 OSTEOPENIA OF MULTIPLE SITES: Status: ACTIVE | Noted: 2017-11-01

## 2017-11-01 PROBLEM — I27.20 PULMONARY HYPERTENSION: Status: ACTIVE | Noted: 2017-11-01

## 2017-11-01 NOTE — PROGRESS NOTES
Subjective:       Patient ID: Ifeoma Badillo is a 88 y.o. female.    Chief Complaint: Follow-up (dementia with behavior disturbance, unspecified dementia type )   she presents to the office today with her son, Al who is her primary caregiver and with her daughter Bernarda who lives and works in [facility].  This note was created with the use of Dragon dictation  They did not try Zoloft at last visit.  She continues to decline cognitively and physically  Al has not been able to get her to a senior center  Al is overwhelmed with her care  There isn't anyone who can spot him  Many times in the past we have discussed nursing home placement, adult , utilizing community resources close to their home such as a senior center, obtaining a caregiver in the home either through a private agencies such as home instead or through [employer] services such as a homemaker.  Telephone numbers given.  HPI  Review of Systems   Constitutional: Negative for activity change, appetite change, chills, fatigue, fever and unexpected weight change.   HENT: Negative for hearing loss.    Eyes: Negative for visual disturbance.   Respiratory: Negative for cough, chest tightness, shortness of breath and wheezing.    Cardiovascular: Negative for chest pain, palpitations and leg swelling.   Gastrointestinal: Positive for constipation. Negative for abdominal pain, nausea and vomiting.   Genitourinary: Negative for dysuria, frequency and urgency.   Musculoskeletal: Positive for back pain. Negative for arthralgias, gait problem, joint swelling and myalgias.   Skin: Positive for rash.   Neurological: Negative for light-headedness and headaches.   Psychiatric/Behavioral: Negative for dysphoric mood and sleep disturbance. The patient is not nervous/anxious.        Objective:      Physical Exam   Constitutional: She appears well-nourished. No distress.   She is clean and well dressed.   HENT:   Head: Normocephalic and atraumatic.   Right  Ear: External ear normal.   Left Ear: External ear normal.   Nose: Nose normal.   Mouth/Throat: Oropharynx is clear and moist.   Eyes: Conjunctivae are normal. No scleral icterus.   Neck: Neck supple.   Cardiovascular: Normal rate, regular rhythm and normal heart sounds.  Exam reveals no gallop and no friction rub.    No murmur heard.  Pulmonary/Chest: Effort normal and breath sounds normal. No respiratory distress.   She has dyspnea on minimal exertion   Abdominal: Soft. There is no tenderness.   She is unable to get onto the exam table.  Stating her abdomen is soft.   Genitourinary:   Genitourinary Comments: She is incontinent of urine and wearing a diaper.  She is able to go the bathroom and cleaning herself after a bowel movement.  She spends most of her time in the wheelchair.  She has a little bit of skin redness in the gluteal fold that does not look like a pressure sore it looks more like a cutaneous candidiasis   Musculoskeletal: She exhibits no edema.   She complains of left leg pain.  The pain is located from the knee down.  There is no difference in physical examination of the right leg compared to left.  Dorsalis pedis and posterior tibialis pulses are intact, bilaterally.  Protective Sensation (w/ 10 gram monofilament):  Right: Intact  Left: Intact    Visual Inspection:  Normal -  Bilateral    Pedal Pulses:   Right: Present  Left: Present    Posterior tibialis:   Right:Present  Left: Present  .   Lymphadenopathy:     She has no cervical adenopathy.   Neurological: She is alert.   Skin: Skin is warm and dry.   Psychiatric: She has a normal mood and affect. Her behavior is normal.   Nursing note and vitals reviewed.      Assessment:       1. Dementia with behavioral disturbance, unspecified dementia type    2. Type 2 diabetes mellitus with stage 3 chronic kidney disease, without long-term current use of insulin    3. Overactive detrusor    4. Hypertension associated with diabetes    5. Atherosclerosis of  aorta    6. Chronic atrial fibrillation    7. Essential hypertension    8. Left leg pain    9. Constipation, unspecified constipation type    10. Auditory hallucination    11. Anxiety    12. Tortuous aorta    13. Abdominal aortic aneurysm (AAA) without rupture    14. Pulmonary hypertension        Plan:   Ifeoma was seen today for follow-up.    Diagnoses and all orders for this visit:    Dementia with behavioral disturbance, unspecified dementia type.  It is just a sad situation.  Her cognitive and physical function is declining.    Type 2 diabetes mellitus with stage 3 chronic kidney disease, without long-term current use of insulin  -     Hemoglobin A1c; Future  -     Lipid panel; Future    Overactive detrusor.  Managed by wearing pull-ups.    Hypertension associated with diabetes.  Blood pressure is at target.  -     metoprolol succinate (TOPROL-XL) 100 MG 24 hr tablet; Take 1 tablet (100 mg total) by mouth once daily.  -     CBC auto differential; Future  -     Comprehensive metabolic panel; Future  -     TSH; Future  -     Lipid panel; Future    Atherosclerosis of aorta.  Good blood pressure control.    Chronic atrial fibrillation.  Excellent rate control.  EKG not checked.  -     metoprolol succinate (TOPROL-XL) 100 MG 24 hr tablet; Take 1 tablet (100 mg total) by mouth once daily.    Essential hypertension, good control.  -     amLODIPine (NORVASC) 5 MG tablet; Take 1 tablet (5 mg total) by mouth every morning. BP control  -     CBC auto differential; Future  -     Comprehensive metabolic panel; Future  -     TSH; Future  -     Lipid panel; Future    Left leg pain.  I think that this is a lumbar radiculopathy.  The patient says that she seldom takes tramadol but she does get pain relief when she needs it from this medication.  Her wheelchair is over 5 years old but in good condition and she does not need a new wheelchair at this time but she is informed that should she need a new wheelchair she is  eligible.    Constipation, unspecified constipation type.  This isn't every day and never.  Recommend high fiber diet    Auditory hallucination.  This is not some much a problem as it was at the last visit.    Anxiety, this relates to her declining cognitive function and easy irritability and confusion.    Tortuous aorta.  Good blood pressure control    Abdominal aortic aneurysm (AAA) without rupture.  Calcified stable.    Pulmonary hypertension.  She does not have a complaint of shortness of breath.  She is limited because of her inability to walk and her cognitive function.    Other orders.  It is my hope that rate will obtain help from his siblings to take care of his mother.  -     Cancel: traMADol (ULTRAM) 50 mg tablet; TAKE 1 TABLET THREE TIMES DAILY AS NEEDED FOR PAIN  -     cholecalciferol, vitamin D3, (VITAMIN D3) 2,000 unit Cap; Take 1 capsule (2,000 Units total) by mouth once daily.  -     cyanocobalamin, vitamin B-12, 1,000 mcg TbSR; Take 1 tablet by mouth every morning. For energy  -     lisinopril 10 MG tablet; Take 1 tablet (10 mg total) by mouth every morning.  -     donepezil (ARICEPT) 10 MG tablet; Take 1 tablet (10 mg total) by mouth every morning.  -     sertraline (ZOLOFT) 25 MG tablet; Take 1 tablet (25 mg total) by mouth every morning. To boost energy, relieve anxiety and improve sleep  Return in about 6 months (around 4/30/2018) for after labs.

## 2017-11-01 NOTE — TELEPHONE ENCOUNTER
Call to Bernarda  [place]  She says some days are better than others  lA is 52, HS education. He had been a long term twice , laid off.  Make an appointment with soc. Worker at [facility] [phone]

## 2017-11-01 NOTE — PROGRESS NOTES
Ifeoma Badillo presented for a  Medicare AWV and comprehensive Health Risk Assessment today. The following components were reviewed and updated:    · Medical history  · Family History  · Social history  · Allergies and Current Medications  · Health Risk Assessment  · Health Maintenance  · Care Team     ** See Completed Assessments for Annual Wellness Visit within the encounter summary.**       The following assessments were completed:  · Living Situation  · CAGE  · Depression Screening  · Timed Get Up and Go  · Whisper Test  · Cognitive Function Screening - not administered secondary dementia diagnosis  · Nutrition Screening  · ADL Screening  · PAQ Screening    There were no vitals filed for this visit.  There is no height or weight on file to calculate BMI.  Physical Exam   Constitutional:   Overweight   Musculoskeletal:   In wheelchair   Neurological: She is alert.   Skin: Skin is warm and dry.   Psychiatric: She has a normal mood and affect.   Nursing note and vitals reviewed.        Diagnoses and health risks identified today and associated recommendations/orders:    1. Encounter for preventive health examination  Here for Health Risk Assessment/Annual Wellness Visit.  Follow up in one year.  Declined Optometry referral.    2. Hypertension associated with diabetes  Chronic, stable on current medications. Followed by PCP.    3. Atherosclerosis of aorta  Chronic, stable.  Noted CXR 3/28/12. Followed by PCP.    4. Chronic atrial fibrillation  Chronic, stable. No long receiving warfarin. Followed by Cardiology    5. Atrial ectopy  Chronic, stable.  Followed by Cardiology.    6. Left ventricular diastolic dysfunction  Chronic, stable. Followed by PCP.    7. Hyperlipidemia, unspecified hyperlipidemia type  Chronic, stable with diet. Followed by PCP.    8. Chronic obstructive pulmonary disease, unspecified COPD type  Chronic, stable. Followed by PCP.    9. ESTUARDO (obstructive sleep apnea)  Chronic, no CPAP use.  Followed  by PCP.    10. Diabetes mellitus due to underlying condition, controlled, with diabetic nephropathy, without long-term current use of insulin  Chronic, stable with diet control. A1C pending. Followed by PCP.    11. CKD (chronic kidney disease) stage 3, GFR 30-59 ml/min  Chronic, stable. Followed by PCP.    12. Type 2 diabetes mellitus with peripheral neuropathy  Chronic, stable with diet control, A1C pending. Followed by PCP.    13. Hereditary and idiopathic peripheral neuropathy  Chronic, stable. Followed by PCP.    14. Dementia with behavioral disturbance, unspecified dementia type  Chronic, stable on current medications. Followed by PCP.    15. Auditory hallucination  Chronic, stable on current medications. Followed by PCP.    16. NPH (normal pressure hydrocephalus),  shunt 2005.  Chronic, stable. Followed by Neurosurgery.    17. Urge incontinence  Chronic, stable. Followed by Urology.    19. Mild protein-calorie malnutrition  Chronic, weight decreased 15 pounds from HRA 8/2017. Albumin 3.2. Reports fair appetite.  Followed by PCP.    19. Osteopenia of multiple sites  Chronic, stable on current medication. Followed by PCP.    20. Anxiety  Chronic, seen by PCP today, medications adjusted. Followed by PCP.      Provided Ifeoma with a 5-10 year written screening schedule and personal prevention plan. Recommendations were developed using the USPSTF age appropriate recommendations. Education, counseling, and referrals were provided as needed. After Visit Summary printed and given to patient which includes a list of additional screenings\tests needed.    Return in about 3 months (around 1/31/2018).with PCP    Shannon Aggarwal NP

## 2017-11-17 RX ORDER — ACETAMINOPHEN, DEXTROMETHORPHAN HBR, DOXYLAMINE SUCCINATE, PHENYLEPHRINE HCL 650; 20; 12.5; 1 MG/30ML; MG/30ML; MG/30ML; MG/30ML
1 SOLUTION ORAL EVERY MORNING
Qty: 100 EACH | Refills: 1 | Status: SHIPPED | OUTPATIENT
Start: 2017-11-17

## 2017-11-17 RX ORDER — SERTRALINE HYDROCHLORIDE 25 MG/1
25 TABLET, FILM COATED ORAL EVERY MORNING
Qty: 30 TABLET | Refills: 11 | Status: SHIPPED | OUTPATIENT
Start: 2017-11-17 | End: 2018-06-28

## 2017-11-17 NOTE — TELEPHONE ENCOUNTER
Spoke with pt's daughter and she stated that the pt has been having diarrhea with adding Zoloft and B12 to her medication.  Pt took all medication together. Daughter would like to know if this medication is causing the pt to have diarrhea

## 2017-11-17 NOTE — TELEPHONE ENCOUNTER
----- Message from Fani Tijerina sent at 11/17/2017 12:26 PM CST -----  Contact: Daughter,  izabela Rodriguez  [phone]  You added  Zoloft and a B-12 to her medications  And she thinks one of them is ca is causeing  Diarrhea.         [facility]  [phone] (Phone) or [phone] (Fax). Please call her.

## 2017-11-29 ENCOUNTER — OFFICE VISIT (OUTPATIENT)
Dept: OPTOMETRY | Facility: CLINIC | Age: 82
End: 2017-11-29
Payer: MEDICARE

## 2017-11-29 DIAGNOSIS — Z96.1 PSEUDOPHAKIA OF BOTH EYES: ICD-10-CM

## 2017-11-29 DIAGNOSIS — H52.4 PRESBYOPIA: ICD-10-CM

## 2017-11-29 DIAGNOSIS — E11.42 TYPE 2 DIABETES MELLITUS WITH PERIPHERAL NEUROPATHY: Primary | ICD-10-CM

## 2017-11-29 DIAGNOSIS — E11.9 TYPE 2 DIABETES MELLITUS WITHOUT OPHTHALMIC MANIFESTATIONS: ICD-10-CM

## 2017-11-29 PROCEDURE — 92004 COMPRE OPH EXAM NEW PT 1/>: CPT | Mod: S$GLB,,, | Performed by: OPTOMETRIST

## 2017-11-29 PROCEDURE — 92015 DETERMINE REFRACTIVE STATE: CPT | Mod: S$GLB,,, | Performed by: OPTOMETRIST

## 2017-11-29 PROCEDURE — 99999 PR PBB SHADOW E&M-EST. PATIENT-LVL II: CPT | Mod: PBBFAC,,, | Performed by: OPTOMETRIST

## 2017-11-29 PROCEDURE — 99499 UNLISTED E&M SERVICE: CPT | Mod: S$GLB,,, | Performed by: OPTOMETRIST

## 2017-11-29 NOTE — PROGRESS NOTES
HPI     Diabetic Eye Exam    Additional comments: DLS 7/21/14           Comments   H/o diabetes  bs controlled  Pseudophakia ou Dr [place]kee[place] 2004  Here for complete check  Occasional floaters  No itching burning or tearing       Last edited by Jewels Webb on 11/29/2017  2:55 PM. (History)            Assessment /Plan     For exam results, see Encounter Report.    Type 2 diabetes mellitus with peripheral neuropathy  Type 2 diabetes mellitus without ophthalmic manifestations   No retinopathy, monitor 1 years    Pseudophakia of both eyes   Mild PCO OU   Stable, monitor    Presbyopia   Rx specs   Ok to use +2.75 readers     RTC 1 year

## 2017-12-13 NOTE — TELEPHONE ENCOUNTER
----- Message from Ximena Avila sent at 12/13/2017  2:10 PM CST -----  Contact: daughter/izabela/[phone]  Pt daughter called in regards to pt stop taking her donepezil (ARICEPT) 10 MG tablet a little bit before thanksgiving. The daughter want to talk to the dr about do she needs to start back taking it.        Please advise

## 2017-12-14 RX ORDER — DONEPEZIL HYDROCHLORIDE 5 MG/1
5 TABLET, FILM COATED ORAL NIGHTLY
Qty: 30 TABLET | Refills: 11 | Status: SHIPPED | OUTPATIENT
Start: 2017-12-14 | End: 2017-12-14 | Stop reason: SDUPTHER

## 2017-12-14 NOTE — TELEPHONE ENCOUNTER
Spoke with pt's daughter Bernarda and she understood that pt is to start medication of 5mg Aricept. Bernarda would like for a 30day supply to go to the [facility] so she can  medication for pt today

## 2017-12-15 ENCOUNTER — TELEPHONE (OUTPATIENT)
Dept: INTERNAL MEDICINE | Facility: CLINIC | Age: 82
End: 2017-12-15

## 2017-12-15 RX ORDER — DONEPEZIL HYDROCHLORIDE 5 MG/1
5 TABLET, FILM COATED ORAL NIGHTLY
Qty: 30 TABLET | Refills: 11 | Status: SHIPPED | OUTPATIENT
Start: 2017-12-15 | End: 2018-06-27 | Stop reason: SDUPTHER

## 2017-12-15 NOTE — TELEPHONE ENCOUNTER
Spoke with pt's daughter and she stated that the pt's medication hasn't been sent to [facility] yet. Daughter ws contacted and was aware that  did send medication to [facility] pharmacy for pt to start

## 2018-01-16 ENCOUNTER — TELEPHONE (OUTPATIENT)
Dept: INTERNAL MEDICINE | Facility: CLINIC | Age: 83
End: 2018-01-16

## 2018-01-16 NOTE — TELEPHONE ENCOUNTER
----- Message from Natty Evangelist sent at 1/16/2018 11:21 AM CST -----  Contact: Daughter/Lianne [phone]  Requesting a call about her mother. Saying she having a few problems and about medications.     Please call and advise.     Thank You

## 2018-01-16 NOTE — TELEPHONE ENCOUNTER
Dr Pina pt would like to talk to you about changes in the pt's mental and physical status. No appointments available until February. Thanks.

## 2018-01-20 ENCOUNTER — TELEPHONE (OUTPATIENT)
Dept: INTERNAL MEDICINE | Facility: CLINIC | Age: 83
End: 2018-01-20

## 2018-01-20 NOTE — TELEPHONE ENCOUNTER
Unable to reach Kristi  Could you call to ask Bernarda what her mother needs?    if they are at the point where they need nursing home placement? I am happy to fill out medical forms if the facility they decided on needs medical information.

## 2018-01-23 NOTE — TELEPHONE ENCOUNTER
Dr Pina, I was able to talk to Bernarda and schedule pt to see you on 02/21/2018. Please call Bernarda at [phone] ,she would like to talk to you. Thanks

## 2018-01-24 NOTE — TELEPHONE ENCOUNTER
Left message for Bernarda.  Still unable to connect  I Left a voice mail review of last visit 10- where alternative care options were presented as her primary caregiver, her son, Al is overwhelmed

## 2018-03-14 ENCOUNTER — OFFICE VISIT (OUTPATIENT)
Dept: CARDIOLOGY | Facility: CLINIC | Age: 83
End: 2018-03-14
Payer: MEDICARE

## 2018-03-14 VITALS
BODY MASS INDEX: 27.59 KG/M2 | SYSTOLIC BLOOD PRESSURE: 162 MMHG | DIASTOLIC BLOOD PRESSURE: 77 MMHG | HEART RATE: 76 BPM | WEIGHT: 161.63 LBS | HEIGHT: 64 IN

## 2018-03-14 DIAGNOSIS — I10 ESSENTIAL HYPERTENSION: ICD-10-CM

## 2018-03-14 DIAGNOSIS — E11.59 HYPERTENSION ASSOCIATED WITH DIABETES: ICD-10-CM

## 2018-03-14 DIAGNOSIS — E78.5 HYPERLIPIDEMIA, UNSPECIFIED HYPERLIPIDEMIA TYPE: Primary | ICD-10-CM

## 2018-03-14 DIAGNOSIS — I15.2 HYPERTENSION ASSOCIATED WITH DIABETES: ICD-10-CM

## 2018-03-14 DIAGNOSIS — I48.20 CHRONIC ATRIAL FIBRILLATION: ICD-10-CM

## 2018-03-14 PROCEDURE — 99214 OFFICE O/P EST MOD 30 MIN: CPT | Mod: GC,S$GLB,, | Performed by: INTERNAL MEDICINE

## 2018-03-14 PROCEDURE — 99999 PR PBB SHADOW E&M-EST. PATIENT-LVL IV: CPT | Mod: PBBFAC,GC,, | Performed by: INTERNAL MEDICINE

## 2018-03-14 RX ORDER — AMLODIPINE BESYLATE 5 MG/1
5 TABLET ORAL EVERY MORNING
Qty: 90 TABLET | Refills: 3 | Status: SHIPPED | OUTPATIENT
Start: 2018-03-14 | End: 2018-06-28 | Stop reason: SDUPTHER

## 2018-03-14 RX ORDER — LISINOPRIL 10 MG/1
10 TABLET ORAL EVERY MORNING
Qty: 90 TABLET | Refills: 3 | Status: SHIPPED | OUTPATIENT
Start: 2018-03-14 | End: 2018-06-28 | Stop reason: SDUPTHER

## 2018-03-14 RX ORDER — METOPROLOL SUCCINATE 100 MG/1
100 TABLET, EXTENDED RELEASE ORAL DAILY
Qty: 90 TABLET | Refills: 3 | Status: SHIPPED | OUTPATIENT
Start: 2018-03-14 | End: 2018-06-28 | Stop reason: SDUPTHER

## 2018-03-14 NOTE — PROGRESS NOTES
Cardiology Clinic Note  Reason for Visit: HTN    HPI:   Ms. Badillo is a 89 year old woman with moderate Alzheimer dementia, HTN, HLD, DM, PAF not on warfarin 2/2 fall risk who presents for follow up. Dr. Polanco saw in May 2017, decreased BB to 100mg daily from 200mg 2/2 bradycardia. Has felt well since and has no complaints today. Daughter and son with her today. Not checking BP regularly. Other than general lethargy, she feels well without CP or SOB. Fell on knees and hit her lip last week, no injury sustained. Need refills    ROS:    Constitution: Negative for fever, chills, weight loss or gain.   HENT: Negative for sore throat, rhinorrhea, or headache.  Eyes: Negative for blurred or double vision.   Cardiovascular: See above  Pulmonary: Negative for SOB   Gastrointestinal: Negative for abdominal pain, nausea, vomiting, or diarrhea.   : Negative for dysuria.   Neurological: Negative for focal weakness or sensory changes.  PMH:     Past Medical History:   Diagnosis Date    Anemia     history    Anticoagulant long-term use     Anxiety 10/31/2017    Arthritis     osteoarthritis-right shoulder    Atrial fibrillation     Back problem     due to spinal stenosis/degenerative disc disease    COPD (chronic obstructive pulmonary disease)     Degenerative disc disease     DEMENTIA     Diabetes mellitus type II     A1c-7.2 (2/22/13)    Diabetes mellitus with renal manifestations, controlled 7/6/2015    Diabetes with neurologic complications     Hearing impairment     mild    Hx: UTI (urinary tract infection)     Hydrocephalus     Hyperlipidemia 2/25/2013    Hypertension     Imbalance     uses walker daily    Neurogenic bladder     Nocturia     NPH (normal pressure hydrocephalus) shunt    Obesity     Sinus congestion     nasal drip    Sleep apnea     Type II or unspecified type diabetes mellitus with neurological manifestations, not stated as uncontrolled(250.60)     Type II or unspecified type  diabetes mellitus with peripheral circulatory disorders, not stated as uncontrolled(250.70)     Type II or unspecified type diabetes mellitus with renal manifestations, not stated as uncontrolled(250.40)     Urinary incontinence     Vertigo     Visual impairment in both eyes      Past Surgical History:   Procedure Laterality Date    ADENOIDECTOMY      BRAIN SURGERY       shunt    CATARACT EXTRACTION W/  INTRAOCULAR LENS IMPLANT Bilateral     cystoscope  12/1/15    cystoscopy with botox injection      EYE SURGERY      phaco with iol-bilateral    SHOULDER SURGERY      right collar bone fracture-s/p fx sx with hardware placed    SINUS SURGERY      tonsillectomy      TONSILLECTOMY       shunt placed      for hydrocephalus     Allergies:   Review of patient's allergies indicates:  No Known Allergies  Medications:     Current Outpatient Prescriptions on File Prior to Visit   Medication Sig Dispense Refill    amLODIPine (NORVASC) 5 MG tablet Take 1 tablet (5 mg total) by mouth every morning. BP control 90 tablet 3    cetirizine (ZYRTEC) 10 MG tablet Take 10 mg by mouth once daily.      donepezil (ARICEPT) 5 MG tablet Take 1 tablet (5 mg total) by mouth every evening. 30 tablet 11    lisinopril 10 MG tablet Take 1 tablet (10 mg total) by mouth every morning. 90 tablet 3    meclizine (ANTIVERT) 12.5 mg tablet Take 1 tablet (12.5 mg total) by mouth 2 (two) times daily as needed. 30 tablet 3    metoprolol succinate (TOPROL-XL) 100 MG 24 hr tablet Take 1 tablet (100 mg total) by mouth once daily. 90 tablet 3    cholecalciferol, vitamin D3, (VITAMIN D3) 2,000 unit Cap Take 1 capsule (2,000 Units total) by mouth once daily. 100 capsule 1    cyanocobalamin, vitamin B-12, 1,000 mcg TbSR Take 1 tablet by mouth every morning. For energy 100 each 1    sertraline (ZOLOFT) 25 MG tablet Take 1 tablet (25 mg total) by mouth every morning. To boost energy, relieve anxiety and improve sleep 30 tablet 11     No  "current facility-administered medications on file prior to visit.      Social History:     Social History   Substance Use Topics    Smoking status: Former Smoker     Packs/day: 1.00     Years: 40.00     Quit date: 1/1/1993    Smokeless tobacco: Never Used    Alcohol use No     Family History:     Family History   Problem Relation Age of Onset    Hypertension Mother     No Known Problems Brother     Arthritis Daughter     Arthritis Son     Hypertension Son     No Known Problems Brother     No Known Problems Brother     No Known Problems Brother     Alzheimer's disease Brother     No Known Problems Son     No Known Problems Maternal Aunt     No Known Problems Maternal Uncle     No Known Problems Paternal Aunt     No Known Problems Paternal Uncle     No Known Problems Maternal Grandmother     No Known Problems Maternal Grandfather     No Known Problems Paternal Grandmother     No Known Problems Paternal Grandfather     Anesthesia problems Neg Hx     Malignant hypertension Neg Hx     Hypotension Neg Hx     Malignant hyperthermia Neg Hx     Pseudochol deficiency Neg Hx     Amblyopia Neg Hx     Blindness Neg Hx     Cancer Neg Hx     Cataracts Neg Hx     Diabetes Neg Hx     Glaucoma Neg Hx     Macular degeneration Neg Hx     Retinal detachment Neg Hx     Strabismus Neg Hx     Stroke Neg Hx     Thyroid disease Neg Hx      Physical Exam:   BP (!) 162/77 (BP Location: Left arm, Patient Position: Sitting, BP Method: Large (Automatic))   Pulse 76   Ht 5' 3.5" (1.613 m)   Wt 73.3 kg (161 lb 9.6 oz)   BMI 28.18 kg/m²      Constitutional: NAD, conversant  HEENT: Sclera anicteric, PERRLA, EOMI  Neck: No JVD, no carotid bruits  CV: RRR, no murmur, normal S1/S2  Pulm: CTAB, no wheezes, rales, or ronchi  GI: Abdomen soft, NTND, +BS  Extremities: No LE edema, warm and well perfused  Skin: No ecchymosis, erythema, or ulcers  Psych: AOx3, appropriate affect  Neuro: CNII-XII intact, no focal " deficits    Labs:     Lab Results   Component Value Date     10/31/2017    K 4.2 10/31/2017     10/31/2017    CO2 27 10/31/2017    BUN 20 10/31/2017    CREATININE 1.2 10/31/2017    ANIONGAP 10 10/31/2017     Lab Results   Component Value Date    HGBA1C 6.9 (H) 10/31/2017     Lab Results   Component Value Date    BNP 62 02/26/2016     (H) 01/20/2015    Lab Results   Component Value Date    WBC 7.83 10/31/2017    HGB 14.9 10/31/2017    HCT 46.3 10/31/2017     10/31/2017    GRAN 5.8 10/31/2017    GRAN 73.5 (H) 10/31/2017     Lab Results   Component Value Date    CHOL 199 10/31/2017    HDL 42 10/31/2017    LDLCALC 132.0 10/31/2017    TRIG 125 10/31/2017          Imaging:    No recent pertinent imaging     EF   Date Value Ref Range Status   04/05/2016 55 55 - 65    01/21/2015 55 55 - 65        Assessment:    Ms. Badillo is an 89 year old female with Alzheimers dementia, HTN DM and AF who presents for follow up. She is pleasantly demented and is doing well. Her BP today is 160/77 but on recheck 138/72. She is not on anticoagulation due to high fall risk. Daughter and son aware of stroke risk for AF not on anticoagulation but agree that her risk of bleeding is substantially higher.      Plan:   #HTN  --cotninue current meds  --if SBP >160 at home consistently, can increase amlodipine    #PAF  --continue BB  --not on anticoagulation  --educated on CVA risk     RTC 8 months    Signed:  Benjamin Griffiths MD  Cardiology Fellow, PGY-5  Pager: [phone]  3/14/2018 3:13 PM

## 2018-04-10 RX ORDER — TRAMADOL HYDROCHLORIDE 50 MG/1
50 TABLET ORAL EVERY 8 HOURS PRN
Qty: 270 TABLET | Refills: 1 | Status: SHIPPED | OUTPATIENT
Start: 2018-04-10 | End: 2018-04-10 | Stop reason: SDUPTHER

## 2018-04-10 RX ORDER — TRAMADOL HYDROCHLORIDE 50 MG/1
50 TABLET ORAL EVERY 8 HOURS PRN
Qty: 270 TABLET | Refills: 1 | Status: SHIPPED | OUTPATIENT
Start: 2018-04-10 | End: 2018-04-11 | Stop reason: SDUPTHER

## 2018-04-10 NOTE — TELEPHONE ENCOUNTER
----- Message from Anaya Mark sent at 4/10/2018 11:49 AM CDT -----  Contact: Yazmin/Daughter/[phone]  Type: Rx    Name of medication(s): tramadol (ULTRAM) 50 mg tablet    Is this a refill? New rx? Refill    Who prescribed medication?     Pharmacy Name, Phone, & Location: [id number] - [place], [place]  [address]    Comments:  Pt's daughter is calling because the pt is in really bad pain.The medication is usually sent through mail order pharmacy but the pt needs medication as soon as possible. Please advise.      Thanks

## 2018-04-10 NOTE — TELEPHONE ENCOUNTER
Since you failed to queue  up the medicine to the [facility]  I had to send this medication a second time.  Please check to be sure it went [facility]  Please call the daughter, Bernarda, the last time she filled   Tramadol was July 2017, so what is going on now?  She was last seen in October 2017, it does she need an appointment now?

## 2018-04-10 NOTE — TELEPHONE ENCOUNTER
Please advise, patients daughter is requesting an rx for Tramadol sent to [facility] on [address]  States patient normally receives tramadol through mail order pharmacy but patient is experiencing pain currently.

## 2018-04-11 RX ORDER — TRAMADOL HYDROCHLORIDE 50 MG/1
50 TABLET ORAL EVERY 8 HOURS PRN
Qty: 270 TABLET | Refills: 1 | Status: SHIPPED | OUTPATIENT
Start: 2018-04-11 | End: 2018-06-28 | Stop reason: SDUPTHER

## 2018-04-11 NOTE — TELEPHONE ENCOUNTER
----- Message from Selvin Luong sent at 4/10/2018  2:59 PM CDT -----  Contact: Bernarda [phone]  Patient's daughter is calling to check status of medication Tramadol . Please call and advise, Thanks

## 2018-05-01 ENCOUNTER — PES CALL (OUTPATIENT)
Dept: ADMINISTRATIVE | Facility: CLINIC | Age: 83
End: 2018-05-01

## 2018-06-27 ENCOUNTER — TELEPHONE (OUTPATIENT)
Dept: INTERNAL MEDICINE | Facility: CLINIC | Age: 83
End: 2018-06-27

## 2018-06-27 DIAGNOSIS — I48.0 PAROXYSMAL ATRIAL FIBRILLATION: ICD-10-CM

## 2018-06-27 PROBLEM — I71.40 ABDOMINAL AORTIC ANEURYSM (AAA) WITHOUT RUPTURE: Status: RESOLVED | Noted: 2017-11-01 | Resolved: 2018-06-27

## 2018-06-27 PROBLEM — F41.9 ANXIETY: Status: RESOLVED | Noted: 2017-10-31 | Resolved: 2018-06-27

## 2018-06-27 PROBLEM — E44.1 MILD PROTEIN-CALORIE MALNUTRITION: Status: RESOLVED | Noted: 2017-11-01 | Resolved: 2018-06-27

## 2018-06-27 RX ORDER — DONEPEZIL HYDROCHLORIDE 5 MG/1
5 TABLET, FILM COATED ORAL NIGHTLY
Qty: 30 TABLET | Refills: 11 | Status: SHIPPED | OUTPATIENT
Start: 2018-06-27 | End: 2018-06-28 | Stop reason: SDUPTHER

## 2018-06-27 NOTE — TELEPHONE ENCOUNTER
Patient missed her appointment yesterday 6/26/18, but patients daughter Bernarda Rodriguez came today with Nursing home papers that she was hoping that PCP could fill out.  Informed Bernarda that patient hasnt been seen since November 2017, and would need current assessment from PCP or a Physician. Patient is scheduled to see Bhavya Mcintyre on 7/2/2018 and patient is scheduled to see PCP on 7/25/2018 also. Daughter made appointment with Bhavya in hopes of getting nursing home paperwork filled out soon. Copy of paperwork and daughter's contact information is in PCP's mail basket. Daughter is available by phone also. Bernarda Rodriguez [phone]    Please advise  Thank you  SALINA Delvalle

## 2018-06-27 NOTE — TELEPHONE ENCOUNTER
I spoke to Bernarda on the phone. They are coming in tomorrow for me to fill out forms for her admission to [facility]

## 2018-06-28 ENCOUNTER — OFFICE VISIT (OUTPATIENT)
Dept: INTERNAL MEDICINE | Facility: CLINIC | Age: 83
End: 2018-06-28
Payer: MEDICARE

## 2018-06-28 VITALS
WEIGHT: 157.44 LBS | HEART RATE: 58 BPM | BODY MASS INDEX: 30.91 KG/M2 | HEIGHT: 60 IN | SYSTOLIC BLOOD PRESSURE: 132 MMHG | DIASTOLIC BLOOD PRESSURE: 60 MMHG | OXYGEN SATURATION: 97 %

## 2018-06-28 DIAGNOSIS — E11.42 TYPE 2 DIABETES MELLITUS WITH PERIPHERAL NEUROPATHY: ICD-10-CM

## 2018-06-28 DIAGNOSIS — I70.0 ATHEROSCLEROSIS OF AORTA: ICD-10-CM

## 2018-06-28 DIAGNOSIS — I10 ESSENTIAL HYPERTENSION: ICD-10-CM

## 2018-06-28 DIAGNOSIS — N39.41 URGE INCONTINENCE: ICD-10-CM

## 2018-06-28 DIAGNOSIS — E11.59 HYPERTENSION ASSOCIATED WITH DIABETES: ICD-10-CM

## 2018-06-28 DIAGNOSIS — E08.21 DIABETES MELLITUS DUE TO UNDERLYING CONDITION, CONTROLLED, WITH DIABETIC NEPHROPATHY, WITHOUT LONG-TERM CURRENT USE OF INSULIN: ICD-10-CM

## 2018-06-28 DIAGNOSIS — G91.2 NPH (NORMAL PRESSURE HYDROCEPHALUS): ICD-10-CM

## 2018-06-28 DIAGNOSIS — I48.20 CHRONIC ATRIAL FIBRILLATION: ICD-10-CM

## 2018-06-28 DIAGNOSIS — I27.20 PULMONARY HYPERTENSION: ICD-10-CM

## 2018-06-28 DIAGNOSIS — N32.81 OVERACTIVE DETRUSOR: ICD-10-CM

## 2018-06-28 DIAGNOSIS — F03.91 DEMENTIA WITH BEHAVIORAL DISTURBANCE, UNSPECIFIED DEMENTIA TYPE: ICD-10-CM

## 2018-06-28 DIAGNOSIS — I15.2 HYPERTENSION ASSOCIATED WITH DIABETES: ICD-10-CM

## 2018-06-28 DIAGNOSIS — I48.0 PAROXYSMAL ATRIAL FIBRILLATION: ICD-10-CM

## 2018-06-28 DIAGNOSIS — I51.9 LEFT VENTRICULAR DIASTOLIC DYSFUNCTION: ICD-10-CM

## 2018-06-28 DIAGNOSIS — Z02.2 ENCOUNTER FOR EXAMINATION FOR ADMISSION TO NURSING HOME: Primary | ICD-10-CM

## 2018-06-28 DIAGNOSIS — N18.30 CKD (CHRONIC KIDNEY DISEASE) STAGE 3, GFR 30-59 ML/MIN: ICD-10-CM

## 2018-06-28 DIAGNOSIS — M48.061 SPINAL STENOSIS OF LUMBAR REGION, UNSPECIFIED WHETHER NEUROGENIC CLAUDICATION PRESENT: ICD-10-CM

## 2018-06-28 PROCEDURE — 99999 PR PBB SHADOW E&M-EST. PATIENT-LVL III: CPT | Mod: PBBFAC,,, | Performed by: INTERNAL MEDICINE

## 2018-06-28 PROCEDURE — 99214 OFFICE O/P EST MOD 30 MIN: CPT | Mod: S$GLB,,, | Performed by: INTERNAL MEDICINE

## 2018-06-28 PROCEDURE — 99499 UNLISTED E&M SERVICE: CPT | Mod: S$GLB,,, | Performed by: INTERNAL MEDICINE

## 2018-06-28 RX ORDER — DONEPEZIL HYDROCHLORIDE 5 MG/1
5 TABLET, FILM COATED ORAL NIGHTLY
Qty: 30 TABLET | Refills: 11 | Status: SHIPPED | OUTPATIENT
Start: 2018-06-28 | End: 2018-06-28 | Stop reason: SDUPTHER

## 2018-06-28 RX ORDER — DONEPEZIL HYDROCHLORIDE 5 MG/1
5 TABLET, FILM COATED ORAL NIGHTLY
Qty: 90 TABLET | Refills: 3 | Status: SHIPPED | OUTPATIENT
Start: 2018-06-28 | End: 2018-06-28 | Stop reason: SDUPTHER

## 2018-06-28 RX ORDER — LISINOPRIL 10 MG/1
10 TABLET ORAL EVERY MORNING
Qty: 90 TABLET | Refills: 3 | Status: ON HOLD | OUTPATIENT
Start: 2018-06-28 | End: 2022-01-01 | Stop reason: HOSPADM

## 2018-06-28 RX ORDER — DONEPEZIL HYDROCHLORIDE 5 MG/1
5 TABLET, FILM COATED ORAL EVERY MORNING
Qty: 90 TABLET | Refills: 3 | Status: SHIPPED | OUTPATIENT
Start: 2018-06-28 | End: 2018-07-03 | Stop reason: SDUPTHER

## 2018-06-28 RX ORDER — METOPROLOL SUCCINATE 100 MG/1
100 TABLET, EXTENDED RELEASE ORAL EVERY MORNING
Qty: 90 TABLET | Refills: 3 | Status: SHIPPED | OUTPATIENT
Start: 2018-06-28 | End: 2021-03-18

## 2018-06-28 RX ORDER — TRAMADOL HYDROCHLORIDE 50 MG/1
50 TABLET ORAL EVERY 8 HOURS PRN
Qty: 270 TABLET | Refills: 1 | Status: SHIPPED | OUTPATIENT
Start: 2018-06-28 | End: 2018-07-23 | Stop reason: SDUPTHER

## 2018-06-28 RX ORDER — AMLODIPINE BESYLATE 5 MG/1
5 TABLET ORAL EVERY MORNING
Qty: 90 TABLET | Refills: 3 | Status: SHIPPED | OUTPATIENT
Start: 2018-06-28 | End: 2021-03-18 | Stop reason: SDUPTHER

## 2018-06-28 NOTE — PROGRESS NOTES
Subjective:       Patient ID: Ifeoma Badillo is a 89 y.o. female.    Chief Complaint: Follow-up (nursing home paperwork)  she presents to the office today with her son, Al who is her primary caregiver and with her daughter Bernarda who lives and works in [facility].    The patient is a sweet woman with a slowly progressive dementia, probably vascular with features of late onset Alzheimer's Disease since 2006. She continues to decline slowly cognitively and physically. The main problem at present is inability to walk well. She uses a walker at home. She needs assistance will all ADLs,  except that she can go to the bathroom for a bowel movement independently. She wears pull ups for urinary incontinence. She is hoping to enter [facility] in [place], [place].    She has a history of type two diabetes but since weight loss she has not required any treatment other than diet for many years.    HPI  Review of Systems   Constitutional: Positive for fatigue.   Genitourinary: Positive for urgency.   Musculoskeletal: Positive for gait problem.   Neurological:        Leg weakness and left leg pain   Psychiatric/Behavioral: Positive for confusion.       Objective:      Physical Exam   Constitutional: She is oriented to person, place, and time. She appears well-developed and well-nourished. No distress.   HENT:   Head: Normocephalic and atraumatic.   Right Ear: External ear normal.   Left Ear: External ear normal.   Nose: Nose normal.   Mouth/Throat: Oropharynx is clear and moist. No oropharyngeal exudate.   Eyes: Conjunctivae are normal. No scleral icterus.   Neck: Normal range of motion. No thyromegaly present.   Cardiovascular: Normal rate and intact distal pulses.  Exam reveals no gallop and no friction rub.    No murmur heard.  Slow atrial fib   Pulmonary/Chest: Effort normal and breath sounds normal. No respiratory distress. She has no wheezes. She has no rales.   Abdominal: Soft. Bowel sounds are normal.  There is no tenderness.   Genitourinary:   Genitourinary Comments: Skin all looks very good.    The right breast mass is unchanged over the past two years.  Left breast: there are no masses, tenderness, nipple discharge, suspicious skin changes or axillary lymph nodes felt.   Musculoskeletal: She exhibits no edema.   Lymphadenopathy:     She has no cervical adenopathy.   Neurological: She is alert and oriented to person, place, and time. Coordination abnormal.   Protective Sensation (w/ 10 gram monofilament):  Right: Intact  Left: Intact    Visual Inspection: subungual hematoma left great toe nail  Normal -  Bilateral    Pedal Pulses:   Right: Present  Left: Present    Posterior tibialis:   Right:Present  Left: Present2   Skin: Skin is warm and dry. Capillary refill takes less than 2 seconds.   No skin breakdown   Psychiatric: She has a normal mood and affect.   Nursing note and vitals reviewed.      Assessment:       1. Encounter for examination for admission to nursing home    2. Dementia with behavioral disturbance, unspecified dementia type    3. NPH (normal pressure hydrocephalus),  shunt 2005.    4. Type 2 diabetes mellitus with peripheral neuropathy    5. Urge incontinence    6. Spinal stenosis of lumbar region, unspecified whether neurogenic claudication present    7. Pulmonary hypertension    8. Paroxysmal atrial fibrillation    9. Overactive detrusor    10. Left ventricular diastolic dysfunction    11. Hypertension associated with diabetes    12. Diabetes mellitus due to underlying condition, controlled, with diabetic nephropathy, without long-term current use of insulin    13. CKD (chronic kidney disease) stage 3, GFR 30-59 ml/min    14. Atherosclerosis of aorta        Plan:   Ifeoma was seen today for follow-up.    Diagnoses and all orders for this visit:    Encounter for examination for admission to nursing home. All forms completed. TB skin test can be done at the facility.    Dementia without  behavioral disturbance, unspecified dementia type, slowly progressive.    NPH (normal pressure hydrocephalus),  shunt 2005. CT scan 2016 showed perfect placement.    Type 2 diabetes mellitus with peripheral neuropathy, diet controlled.    Urge incontinence, wears pull ups.    Spinal stenosis of lumbar region, unspecified whether neurogenic claudication present    Pulmonary hypertension, she has stable dyspnea on exertion.    Paroxysmal atrial fibrillation, Metoprolol rate controlled. Off coumadin since 2016.    Left ventricular diastolic dysfunction, stable.    Hypertension associated with diabetes, well controlled.    CKD (chronic kidney disease) stage 3, GFR 30-59 ml/min, stable.    Atherosclerosis of aorta    Other orders  -     donepezil (ARICEPT) 5 MG tablet; Take 1 tablet (5 mg total) by mouth every evening.    Medication List with Changes/Refills   Current Medications    CHOLECALCIFEROL, VITAMIN D3, (VITAMIN D3) 2,000 UNIT CAP    Take 1 capsule (2,000 Units total) by mouth once daily.    CYANOCOBALAMIN, VITAMIN B-12, 1,000 MCG TBSR    Take 1 tablet by mouth every morning. For energy   Changed and/or Refilled Medications    Modified Medication Previous Medication    AMLODIPINE (NORVASC) 5 MG TABLET amLODIPine (NORVASC) 5 MG tablet       Take 1 tablet (5 mg total) by mouth every morning. BP control    Take 1 tablet (5 mg total) by mouth every morning. BP control    DONEPEZIL (ARICEPT) 5 MG TABLET donepezil (ARICEPT) 5 MG tablet       Take 1 tablet (5 mg total) by mouth every morning.    Take 1 tablet (5 mg total) by mouth every evening.    LISINOPRIL 10 MG TABLET lisinopril 10 MG tablet       Take 1 tablet (10 mg total) by mouth every morning.    Take 1 tablet (10 mg total) by mouth every morning.    METOPROLOL SUCCINATE (TOPROL-XL) 100 MG 24 HR TABLET metoprolol succinate (TOPROL-XL) 100 MG 24 hr tablet       Take 1 tablet (100 mg total) by mouth every morning.    Take 1 tablet (100 mg total) by mouth once  daily.    TRAMADOL (ULTRAM) 50 MG TABLET traMADol (ULTRAM) 50 mg tablet       Take 1 tablet (50 mg total) by mouth every 8 (eight) hours as needed for Pain.    Take 1 tablet (50 mg total) by mouth every 8 (eight) hours as needed for Pain.   Discontinued Medications    CETIRIZINE (ZYRTEC) 10 MG TABLET    Take 10 mg by mouth once daily.    MECLIZINE (ANTIVERT) 12.5 MG TABLET    Take 1 tablet (12.5 mg total) by mouth 2 (two) times daily as needed.    SERTRALINE (ZOLOFT) 25 MG TABLET    Take 1 tablet (25 mg total) by mouth every morning. To boost energy, relieve anxiety and improve sleep

## 2018-07-03 ENCOUNTER — TELEPHONE (OUTPATIENT)
Dept: INTERNAL MEDICINE | Facility: CLINIC | Age: 83
End: 2018-07-03

## 2018-07-03 RX ORDER — DONEPEZIL HYDROCHLORIDE 5 MG/1
5 TABLET, FILM COATED ORAL EVERY MORNING
Qty: 90 TABLET | Refills: 3 | Status: SHIPPED | OUTPATIENT
Start: 2018-07-03 | End: 2021-03-18 | Stop reason: SDUPTHER

## 2018-07-23 ENCOUNTER — TELEPHONE (OUTPATIENT)
Dept: INTERNAL MEDICINE | Facility: CLINIC | Age: 83
End: 2018-07-23

## 2018-07-23 RX ORDER — TRAMADOL HYDROCHLORIDE 50 MG/1
50 TABLET ORAL EVERY 8 HOURS PRN
Qty: 270 TABLET | Refills: 1 | Status: ON HOLD | OUTPATIENT
Start: 2018-07-23 | End: 2022-01-01 | Stop reason: HOSPADM

## 2018-07-23 NOTE — TELEPHONE ENCOUNTER
----- Message from Ayesha Escobedo sent at 7/23/2018  4:55 PM CDT -----  Contact: Mehreen Munozuma/DENIL[place] [phone]  Stated that the patient was admitted into their facility yesterday and is out of Rx  traMADol (ULTRAM) 50 mg tablet. They only use the local pharmacy and cannot use the refill at [facility]. Requesting a refill be sent to [facility] [employer] [employer]. - [facility] [address].    Please call and advise.    Thank You

## 2018-07-23 NOTE — TELEPHONE ENCOUNTER
Ayesha Duncan DILIP Staff   Caller: Mehreen Sully alisha Jonesuma/ [phone] (Today,  4:55 PM)             Stated that the patient was admitted into their facility yesterday and is out of Rx  traMADol (ULTRAM) 50 mg tablet. They only use the local pharmacy and cannot use the refill at [facility]. Requesting a refill be sent to [facility], [employer]. - [place], [place] - [address].     Please call and advise.     Thank You          Please advise.  Thank you

## 2018-08-02 ENCOUNTER — PES CALL (OUTPATIENT)
Dept: ADMINISTRATIVE | Facility: CLINIC | Age: 83
End: 2018-08-02

## 2018-09-19 PROBLEM — I48.91 ATRIAL FIBRILLATION WITH RAPID VENTRICULAR RESPONSE: Status: ACTIVE | Noted: 2018-09-19

## 2018-09-19 PROBLEM — E11.9 TYPE 2 DIABETES MELLITUS WITHOUT COMPLICATION, WITHOUT LONG-TERM CURRENT USE OF INSULIN: Status: ACTIVE | Noted: 2018-09-19

## 2018-09-19 PROBLEM — E11.29 TYPE 2 DIABETES MELLITUS WITH RENAL COMPLICATION: Status: ACTIVE | Noted: 2018-09-19

## 2019-06-11 ENCOUNTER — PES CALL (OUTPATIENT)
Dept: ADMINISTRATIVE | Facility: CLINIC | Age: 84
End: 2019-06-11

## 2021-11-11 PROBLEM — Z17.0 MALIGNANT NEOPLASM OF UPPER-OUTER QUADRANT OF LEFT BREAST IN FEMALE, ESTROGEN RECEPTOR POSITIVE: Status: ACTIVE | Noted: 2021-01-01

## 2021-11-11 PROBLEM — C50.412 MALIGNANT NEOPLASM OF UPPER-OUTER QUADRANT OF LEFT BREAST IN FEMALE, ESTROGEN RECEPTOR POSITIVE: Status: ACTIVE | Noted: 2021-01-01

## 2022-08-24 PROBLEM — I77.1 TORTUOUS AORTA: Status: RESOLVED | Noted: 2017-11-01 | Resolved: 2022-01-01

## 2022-08-24 PROBLEM — R44.0 AUDITORY HALLUCINATION: Status: RESOLVED | Noted: 2017-05-25 | Resolved: 2022-01-01

## 2022-09-27 PROBLEM — I26.99 ACUTE PULMONARY EMBOLISM WITHOUT ACUTE COR PULMONALE: Status: ACTIVE | Noted: 2022-01-01

## 2022-09-28 PROBLEM — J98.11 ATELECTASIS: Status: ACTIVE | Noted: 2022-01-01

## 2022-09-28 PROBLEM — I72.9 ANEURYSM: Status: ACTIVE | Noted: 2022-01-01

## 2022-09-28 PROBLEM — I50.22 CHRONIC HFREF (HEART FAILURE WITH REDUCED EJECTION FRACTION): Status: ACTIVE | Noted: 2022-01-01

## 2022-09-28 PROBLEM — R13.12 OROPHARYNGEAL DYSPHAGIA: Status: ACTIVE | Noted: 2022-01-01

## 2022-09-28 PROBLEM — J96.01 ACUTE HYPOXEMIC RESPIRATORY FAILURE: Status: ACTIVE | Noted: 2022-01-01

## 2022-09-28 PROBLEM — C50.912 LEFT BREAST CANCER WITH T3 TUMOR, >5 CM IN GREATEST DIMENSION: Status: ACTIVE | Noted: 2022-01-01

## 2022-09-28 PROBLEM — E63.9 INADEQUATE DIETARY ENERGY INTAKE: Status: ACTIVE | Noted: 2022-01-01

## 2022-09-30 PROBLEM — R31.9 HEMATURIA: Status: ACTIVE | Noted: 2022-01-01

## 2022-09-30 PROBLEM — D35.01 ADRENAL ADENOMA, RIGHT: Status: ACTIVE | Noted: 2022-01-01

## 2022-09-30 PROBLEM — N26.1 RIGHT RENAL ATROPHY: Status: ACTIVE | Noted: 2022-01-01

## 2022-10-03 PROBLEM — R65.20 SEVERE SEPSIS: Status: ACTIVE | Noted: 2022-01-01

## 2022-10-03 PROBLEM — N39.0 UTI (URINARY TRACT INFECTION): Status: ACTIVE | Noted: 2022-01-01

## 2022-10-03 PROBLEM — A41.9 SEVERE SEPSIS: Status: ACTIVE | Noted: 2022-01-01

## 2022-10-05 PROBLEM — E11.29 TYPE 2 DIABETES MELLITUS WITH RENAL COMPLICATION: Chronic | Status: ACTIVE | Noted: 2018-09-19

## 2022-10-12 PROBLEM — J81.1 PULMONARY EDEMA: Status: ACTIVE | Noted: 2022-01-01

## 2022-10-13 PROBLEM — Z51.5 COMFORT MEASURES ONLY STATUS: Status: ACTIVE | Noted: 2022-01-01
